# Patient Record
Sex: FEMALE | Race: WHITE | Employment: OTHER | URBAN - METROPOLITAN AREA
[De-identification: names, ages, dates, MRNs, and addresses within clinical notes are randomized per-mention and may not be internally consistent; named-entity substitution may affect disease eponyms.]

---

## 2017-03-03 ENCOUNTER — OFFICE VISIT (OUTPATIENT)
Dept: FAMILY MEDICINE CLINIC | Age: 77
End: 2017-03-03

## 2017-03-03 VITALS
TEMPERATURE: 97.5 F | WEIGHT: 180 LBS | HEIGHT: 61 IN | BODY MASS INDEX: 33.99 KG/M2 | DIASTOLIC BLOOD PRESSURE: 82 MMHG | RESPIRATION RATE: 20 BRPM | SYSTOLIC BLOOD PRESSURE: 122 MMHG | HEART RATE: 80 BPM

## 2017-03-03 DIAGNOSIS — R05.8 PRODUCTIVE COUGH: Primary | ICD-10-CM

## 2017-03-03 DIAGNOSIS — F43.21 GRIEF REACTION: ICD-10-CM

## 2017-03-03 PROCEDURE — 99213 OFFICE O/P EST LOW 20 MIN: CPT | Performed by: FAMILY MEDICINE

## 2017-03-03 RX ORDER — AZITHROMYCIN 250 MG/1
TABLET, FILM COATED ORAL
Qty: 1 PACKET | Refills: 0 | Status: SHIPPED | OUTPATIENT
Start: 2017-03-03 | End: 2017-03-13

## 2017-03-03 ASSESSMENT — ENCOUNTER SYMPTOMS
SINUS PRESSURE: 1
CONSTIPATION: 0
SORE THROAT: 1
COUGH: 1
DIARRHEA: 0
ABDOMINAL PAIN: 0

## 2017-05-22 RX ORDER — LEVOTHYROXINE SODIUM 0.03 MG/1
TABLET ORAL
Qty: 90 TABLET | Refills: 1 | Status: SHIPPED | OUTPATIENT
Start: 2017-05-22 | End: 2017-11-04 | Stop reason: SDUPTHER

## 2017-10-03 ENCOUNTER — OFFICE VISIT (OUTPATIENT)
Dept: FAMILY MEDICINE CLINIC | Age: 77
End: 2017-10-03

## 2017-10-03 VITALS
TEMPERATURE: 98.1 F | HEIGHT: 61 IN | WEIGHT: 174.2 LBS | HEART RATE: 78 BPM | BODY MASS INDEX: 32.89 KG/M2 | RESPIRATION RATE: 12 BRPM | SYSTOLIC BLOOD PRESSURE: 130 MMHG | DIASTOLIC BLOOD PRESSURE: 80 MMHG

## 2017-10-03 DIAGNOSIS — E78.5 HYPERLIPIDEMIA, UNSPECIFIED HYPERLIPIDEMIA TYPE: ICD-10-CM

## 2017-10-03 DIAGNOSIS — E03.9 ACQUIRED HYPOTHYROIDISM: ICD-10-CM

## 2017-10-03 DIAGNOSIS — M79.602 LEFT ARM PAIN: Primary | ICD-10-CM

## 2017-10-03 DIAGNOSIS — F32.5 MAJOR DEPRESSION, SINGLE EPISODE, IN COMPLETE REMISSION (HCC): ICD-10-CM

## 2017-10-03 DIAGNOSIS — R53.83 FATIGUE, UNSPECIFIED TYPE: ICD-10-CM

## 2017-10-03 LAB
ALBUMIN SERPL-MCNC: 4.1 G/DL (ref 3.9–4.9)
ALP BLD-CCNC: 78 U/L (ref 40–130)
ALT SERPL-CCNC: 13 U/L (ref 0–33)
ANION GAP SERPL CALCULATED.3IONS-SCNC: 14 MEQ/L (ref 7–13)
AST SERPL-CCNC: 15 U/L (ref 0–35)
BASOPHILS ABSOLUTE: 0 K/UL (ref 0–0.2)
BASOPHILS RELATIVE PERCENT: 0.6 %
BILIRUB SERPL-MCNC: 0.3 MG/DL (ref 0–1.2)
BUN BLDV-MCNC: 7 MG/DL (ref 8–23)
CALCIUM SERPL-MCNC: 9.4 MG/DL (ref 8.6–10.2)
CHLORIDE BLD-SCNC: 100 MEQ/L (ref 98–107)
CHOLESTEROL, TOTAL: 175 MG/DL (ref 0–199)
CO2: 26 MEQ/L (ref 22–29)
CREAT SERPL-MCNC: 0.59 MG/DL (ref 0.5–0.9)
EOSINOPHILS ABSOLUTE: 0.1 K/UL (ref 0–0.7)
EOSINOPHILS RELATIVE PERCENT: 1.8 %
GFR AFRICAN AMERICAN: >60
GFR NON-AFRICAN AMERICAN: >60
GLOBULIN: 2.5 G/DL (ref 2.3–3.5)
GLUCOSE BLD-MCNC: 112 MG/DL (ref 74–109)
HCT VFR BLD CALC: 41.9 % (ref 37–47)
HDLC SERPL-MCNC: 73 MG/DL (ref 40–59)
HEMOGLOBIN: 13.8 G/DL (ref 12–16)
LDL CHOLESTEROL CALCULATED: 82 MG/DL (ref 0–129)
LYMPHOCYTES ABSOLUTE: 1.3 K/UL (ref 1–4.8)
LYMPHOCYTES RELATIVE PERCENT: 23.8 %
MCH RBC QN AUTO: 28.1 PG (ref 27–31.3)
MCHC RBC AUTO-ENTMCNC: 32.8 % (ref 33–37)
MCV RBC AUTO: 85.5 FL (ref 82–100)
MONOCYTES ABSOLUTE: 0.4 K/UL (ref 0.2–0.8)
MONOCYTES RELATIVE PERCENT: 8 %
NEUTROPHILS ABSOLUTE: 3.6 K/UL (ref 1.4–6.5)
NEUTROPHILS RELATIVE PERCENT: 65.8 %
PDW BLD-RTO: 14.5 % (ref 11.5–14.5)
PLATELET # BLD: 281 K/UL (ref 130–400)
POTASSIUM SERPL-SCNC: 4.4 MEQ/L (ref 3.5–5.1)
RBC # BLD: 4.91 M/UL (ref 4.2–5.4)
SODIUM BLD-SCNC: 140 MEQ/L (ref 132–144)
T4 FREE: 1.41 NG/DL (ref 0.93–1.7)
TOTAL PROTEIN: 6.6 G/DL (ref 6.4–8.1)
TRIGL SERPL-MCNC: 99 MG/DL (ref 0–200)
TSH SERPL DL<=0.05 MIU/L-ACNC: 2.49 UIU/ML (ref 0.27–4.2)
WBC # BLD: 5.5 K/UL (ref 4.8–10.8)

## 2017-10-03 PROCEDURE — 99213 OFFICE O/P EST LOW 20 MIN: CPT | Performed by: FAMILY MEDICINE

## 2017-10-03 RX ORDER — RANITIDINE 150 MG/1
TABLET ORAL
Refills: 6 | COMMUNITY
Start: 2017-09-05 | End: 2018-11-01 | Stop reason: SDUPTHER

## 2017-10-03 RX ORDER — PREDNISONE 10 MG/1
TABLET ORAL
Qty: 51 TABLET | Refills: 0 | Status: SHIPPED | OUTPATIENT
Start: 2017-10-03 | End: 2017-10-13

## 2017-10-03 ASSESSMENT — PATIENT HEALTH QUESTIONNAIRE - PHQ9
SUM OF ALL RESPONSES TO PHQ9 QUESTIONS 1 & 2: 0
SUM OF ALL RESPONSES TO PHQ QUESTIONS 1-9: 0
2. FEELING DOWN, DEPRESSED OR HOPELESS: 0
1. LITTLE INTEREST OR PLEASURE IN DOING THINGS: 0

## 2017-10-03 ASSESSMENT — ENCOUNTER SYMPTOMS
NAUSEA: 0
ABDOMINAL PAIN: 0
SHORTNESS OF BREATH: 0
COUGH: 0
CONSTIPATION: 0
EYES NEGATIVE: 1
DIARRHEA: 0

## 2017-10-03 NOTE — PROGRESS NOTES
[address] [facility], 65 y.o. female presents today with:  Chief Complaint   Patient presents with    Arm Pain     would like to discuss right arm pain x 1 1/2 wks. states that this is unchanged. states that pain is moderate to severe when pain goes into the back. does wake her up out if her sleep at night, has tried OTC advil 600 mg no relief. denies injury, and swelling. states that when she tries to  something she gets a pain. HPI    Patient with complaints of left-sided shoulder and neck pain. Was helping her daughter [address] 2 weeks ago. No other questions and or concerns for today's visit      Review of Systems   Constitutional: Negative for activity change, appetite change, fatigue and fever. HENT: Negative for ear pain. Eyes: Negative. Respiratory: Negative for cough and shortness of breath. Cardiovascular: Negative for chest pain and palpitations. Gastrointestinal: Negative for abdominal pain, constipation, diarrhea and nausea. Genitourinary: Negative for dysuria and frequency. Neurological: Negative for dizziness and headaches. 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  repeat colonoscopy in one year    COLONOSCOPY  11/13/15    DR Aquilino Love    UPPER GASTROINTESTINAL ENDOSCOPY  8/17/09    DR Albertina Peoples   [facility] GASTROINTESTINAL ENDOSCOPY  10/31/14    DR Kayli Ford     Social History     Social History    Marital status:      Spouse name: Sierra Sloan    Number of children: 3    Years of education: N/A     Occupational History          retired     Social History Main Topics    Smoking status: Never Smoker    Smokeless tobacco: Never Used    Alcohol use No    Drug use: No    Sexual activity: Not on file     Other Topics Concern    Not on file     Social History Narrative     Family History   Problem Relation Age of Onset    Asthma Mother     Diabetes Mother     Heart Disease Mother     High Blood Pressure Mother     Emphysema Mother     High Blood Pressure Father      No Known Allergies  Current Outpatient Prescriptions   Medication Sig Dispense Refill    ranitidine (ZANTAC) 150 MG tablet TAKE ONE TABLET BY MOUTH DAILY EVERY 12 TO 24 HOURS  6    levothyroxine (SYNTHROID) 25 MCG tablet TAKE ONE TABLET BY MOUTH DAILY 90 tablet 1    omeprazole (PRILOSEC) 40 MG delayed release capsule Take 1 capsule by mouth daily 90 capsule 3    albuterol (PROVENTIL HFA;VENTOLIN HFA) 108 (90 BASE) MCG/ACT inhaler Inhale 2 puffs into the lungs 2 times daily 2 Inhaler 3    Multiple Vitamin (MULTIVITAMIN PO) Take  by mouth daily.  aspirin 81 MG EC tablet Take 81 mg by mouth daily.  calcium carbonate (OSCAL) 500 MG TABS tablet Take 500 mg by mouth daily. No current facility-administered medications for this visit. PMH, Surgical Hx, Family Hx, and Social Hx reviewed and updated. Health Maintenance reviewed. Objective    Vitals:    10/03/17 1157   BP: 130/80   Pulse: 78   Resp: 12   Temp: 98.1 °F (36.7 °C)   TempSrc: Temporal   Weight: 174 lb 3.2 oz (79 kg)   Height: 5' 1\" (1.549 m)       Physical Exam   Constitutional: She is oriented to person, place, and time. She appears well-developed and well-nourished. HENT:   Head: Normocephalic.    Mouth/Throat: Oropharynx is clear and moist. Eyes: Pupils are equal, round, and reactive to light. Neck: Normal range of motion. Neck supple. No thyromegaly present. Cardiovascular: Normal rate and intact distal pulses. Exam reveals no gallop and no friction rub. No murmur heard. Pulmonary/Chest: Effort normal and breath sounds normal.   Abdominal: Soft. Bowel sounds are normal.   Musculoskeletal: Normal range of motion. Neurological: She is alert and oriented to person, place, and time. Skin: Skin is warm and dry. Psychiatric: She has a normal mood and affect. Her behavior is normal.     She has some crepitus with rotational movements of the shoulder paracervical and trapezius muscles are spasming she has massage therapist coming to the house in 2 days. She also has some pain and tenderness of the biceps tendon and the lateral pack musculature on the left side. Assessment shoulder soft tissue inflammation and pain osteoarthritis is no evidence of cardiac dysfunction she was concerned about that she had an normal heart cath in October 2014. This point burst and taper steroids. The morning I's by massage and follow-up if not improved. Assessment & Plan   1. Left arm pain     2. Acquired hypothyroidism  TSH Without Reflex    T4, Free   3. Fatigue, unspecified type  CBC Auto Differential    Comprehensive Metabolic Panel   4.  Hyperlipidemia, unspecified hyperlipidemia type  Lipid Panel     Orders Placed This Encounter   Procedures    TSH Without Reflex     Standing Status:   Future     Standing Expiration Date:   10/3/2018    T4, Free     Standing Status:   Future     Standing Expiration Date:   10/3/2018    CBC Auto Differential     Standing Status:   Future     Standing Expiration Date:   10/3/2018    Comprehensive Metabolic Panel     Standing Status:   Future     Standing Expiration Date:   10/3/2018    Lipid Panel     Standing Status:   Future     Standing Expiration Date:   10/3/2018     Order Specific Question:   Is Patient Fasting?/# of Hours     Answer:   yes     Orders Placed This Encounter   Medications    predniSONE (DELTASONE) 10 MG tablet     Sig: Take 6 tabs x 6 days, then 5 x 1, 4 x 1, 3 x 1, 2 x 1, 1 x 1     Dispense:  51 tablet     Refill:  0     There are no discontinued medications. No Follow-up on file.         Controlled Substances Monitoring:          Cecilia Liz MD

## 2017-11-06 RX ORDER — LEVOTHYROXINE SODIUM 0.03 MG/1
TABLET ORAL
Qty: 90 TABLET | Refills: 0 | Status: SHIPPED | OUTPATIENT
Start: 2017-11-06 | End: 2017-11-07 | Stop reason: SDUPTHER

## 2017-11-07 ENCOUNTER — OFFICE VISIT (OUTPATIENT)
Dept: FAMILY MEDICINE CLINIC | Age: 77
End: 2017-11-07

## 2017-11-07 VITALS
SYSTOLIC BLOOD PRESSURE: 130 MMHG | HEART RATE: 61 BPM | TEMPERATURE: 97 F | HEIGHT: 61 IN | OXYGEN SATURATION: 96 % | WEIGHT: 177 LBS | BODY MASS INDEX: 33.42 KG/M2 | DIASTOLIC BLOOD PRESSURE: 76 MMHG | RESPIRATION RATE: 14 BRPM

## 2017-11-07 DIAGNOSIS — Z23 NEED FOR INFLUENZA VACCINATION: ICD-10-CM

## 2017-11-07 DIAGNOSIS — M25.512 LEFT SHOULDER PAIN, UNSPECIFIED CHRONICITY: Primary | ICD-10-CM

## 2017-11-07 DIAGNOSIS — Z12.31 VISIT FOR SCREENING MAMMOGRAM: ICD-10-CM

## 2017-11-07 PROCEDURE — 99213 OFFICE O/P EST LOW 20 MIN: CPT | Performed by: FAMILY MEDICINE

## 2017-11-07 PROCEDURE — G0008 ADMIN INFLUENZA VIRUS VAC: HCPCS | Performed by: FAMILY MEDICINE

## 2017-11-07 PROCEDURE — 90662 IIV NO PRSV INCREASED AG IM: CPT | Performed by: FAMILY MEDICINE

## 2017-11-07 RX ORDER — LEVOTHYROXINE SODIUM 0.03 MG/1
TABLET ORAL
Qty: 90 TABLET | Refills: 3 | Status: SHIPPED | OUTPATIENT
Start: 2017-11-07 | End: 2018-12-04 | Stop reason: SDUPTHER

## 2017-11-07 RX ORDER — LEVOTHYROXINE SODIUM 0.03 MG/1
TABLET ORAL
Qty: 90 TABLET | Refills: 1 | Status: CANCELLED | OUTPATIENT
Start: 2017-11-07

## 2017-11-07 ASSESSMENT — ENCOUNTER SYMPTOMS
EYES NEGATIVE: 1
SHORTNESS OF BREATH: 0
CONSTIPATION: 0
COUGH: 0
DIARRHEA: 0
NAUSEA: 0
ABDOMINAL PAIN: 0

## 2017-11-07 NOTE — PROGRESS NOTES
[address] [facility], 65 y.o. female presents today with:  Chief Complaint   Patient presents with    Shoulder Pain     follow up on left shoulder pain. was seen on 10/03 was given prednisone 10 mg with mild relief. states that pain is worst at night, does have trouble sleeping, will use heating pad with mild relief     Mole     mole on left shoulder is dark in color does have itchng. HPI    Patient for follow-up left shoulder pain also needs flu shot mammogram    No other questions and or concerns for today's visit      Review of Systems   Constitutional: Negative for activity change, appetite change, fatigue and fever. HENT: Negative for ear pain. Eyes: Negative. Respiratory: Negative for cough and shortness of breath. Cardiovascular: Negative for chest pain and palpitations. Gastrointestinal: Negative for abdominal pain, constipation, diarrhea and nausea. Genitourinary: Negative for dysuria and frequency. Neurological: Negative for dizziness and headaches. 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  repeat colonoscopy in one year    COLONOSCOPY  11/13/15    DR Miah Roman    UPPER GASTROINTESTINAL ENDOSCOPY  8/17/09    DR Chandana Mcadams    UPPER GASTROINTESTINAL ENDOSCOPY  10/31/14    DR Duffy Overall     Social History     Social History    Marital status:      Spouse name: Maria Ines Hassan    Number of children: 3    Years of education: N/A     Occupational History          retired     Social History Main Topics    Smoking status: Never Smoker    Smokeless tobacco: Never Used    Alcohol use No    Drug use: No    Sexual activity: Not on file     Other Topics Concern    Not on file     Social History Narrative    No narrative on file     Family History   Problem Relation Age of Onset    Asthma Mother     Diabetes Mother     Heart Disease Mother     High Blood Pressure Mother     Emphysema Mother     High Blood Pressure Father      No Known Allergies  Current Outpatient Prescriptions   Medication Sig Dispense Refill    levothyroxine (SYNTHROID) 25 MCG tablet TAKE ONE TABLET BY MOUTH EVERY DAY 90 tablet 0    ranitidine (ZANTAC) 150 MG tablet TAKE ONE TABLET BY MOUTH DAILY EVERY 12 TO 24 HOURS  6    omeprazole (PRILOSEC) 40 MG delayed release capsule Take 1 capsule by mouth daily 90 capsule 3    albuterol (PROVENTIL HFA;VENTOLIN HFA) 108 (90 BASE) MCG/ACT inhaler Inhale 2 puffs into the lungs 2 times daily 2 Inhaler 3    Multiple Vitamin (MULTIVITAMIN PO) Take  by mouth daily.  aspirin 81 MG EC tablet Take 81 mg by mouth daily.  calcium carbonate (OSCAL) 500 MG TABS tablet Take 500 mg by mouth daily. No current facility-administered medications for this visit. PMH, Surgical Hx, Family Hx, and Social Hx reviewed and updated. Health Maintenance reviewed. Objective    Vitals:    11/07/17 1439   BP: 130/76   Position: Sitting   Pulse: 61   Resp: 14   Temp: 97 °F (36.1 °C)   TempSrc: Temporal   SpO2: 96%   Weight: 177 lb (80.3 kg)   Height: 5' 1\" (1.549 m)       Physical Exam   Constitutional: She is oriented to person, place, and time. She appears well-developed and well-nourished. HENT:   Head: Normocephalic.    Mouth/Throat: Oropharynx is clear and moist.   Eyes: Pupils are equal, round, and reactive to light.   Neck: Normal range of motion. Neck supple. No thyromegaly present. Cardiovascular: Normal rate and intact distal pulses. Exam reveals no gallop and no friction rub. No murmur heard. Pulmonary/Chest: Effort normal and breath sounds normal.   Abdominal: Soft. Bowel sounds are normal.   Musculoskeletal: Normal range of motion. Neurological: She is alert and oriented to person, place, and time. Skin: Skin is warm and dry. Psychiatric: She has a normal mood and affect. Her behavior is normal.     The pain in the left arm in the biceps tendon is resolved she still has a little pain into the left upper chest since left-sided neck and left trapezius she's been getting therapeutic massages his been helping she'll continue to do that this does not resolve completely may need physical therapy referral.  Physical exam is normal as stated in the chart we'll get her her flu shot today and schedule her for mammography she leaves for [place] in January. Will return in April. Assessment & Plan   1. Left shoulder pain, unspecified chronicity     2. Need for influenza vaccination  INFLUENZA, HIGH DOSE, 65 YRS +, IM, PF, PREFILL SYR, 0.5ML (FLUZONE HD)   3. Visit for screening mammogram  VERONA DIGITAL SCREEN W OR WO CAD BILATERAL     Orders Placed This Encounter   Procedures    VERONA DIGITAL SCREEN W OR WO CAD BILATERAL     Standing Status:   Future     Standing Expiration Date:   1/7/2019     Order Specific Question:   Reason for exam:     Answer:   routine    INFLUENZA, HIGH DOSE, 65 YRS +, IM, PF, PREFILL SYR, 0.5ML (FLUZONE HD)     No orders of the defined types were placed in this encounter. There are no discontinued medications. No Follow-up on file.         Controlled Substances Monitoring:          Jayde Valdez MD

## 2017-11-13 ENCOUNTER — HOSPITAL ENCOUNTER (OUTPATIENT)
Dept: WOMENS IMAGING | Age: 77
Discharge: HOME | End: 2017-11-13
Payer: MEDICARE

## 2017-11-13 DIAGNOSIS — Z12.31 VISIT FOR SCREENING MAMMOGRAM: ICD-10-CM

## 2017-11-13 PROCEDURE — G0202 SCR MAMMO BI INCL CAD: HCPCS

## 2018-06-11 ENCOUNTER — OFFICE VISIT (OUTPATIENT)
Dept: OBGYN CLINIC | Age: 78
End: 2018-06-11
Payer: MEDICARE

## 2018-06-11 VITALS
SYSTOLIC BLOOD PRESSURE: 130 MMHG | BODY MASS INDEX: 32.85 KG/M2 | WEIGHT: 174 LBS | DIASTOLIC BLOOD PRESSURE: 74 MMHG | HEIGHT: 61 IN

## 2018-06-11 DIAGNOSIS — R39.89 BLADDER PAIN: ICD-10-CM

## 2018-06-11 DIAGNOSIS — N63.13 MASS OF LOWER OUTER QUADRANT OF RIGHT BREAST: ICD-10-CM

## 2018-06-11 DIAGNOSIS — Z12.31 SCREENING MAMMOGRAM, ENCOUNTER FOR: ICD-10-CM

## 2018-06-11 DIAGNOSIS — Z11.51 SCREENING FOR HPV (HUMAN PAPILLOMAVIRUS): ICD-10-CM

## 2018-06-11 DIAGNOSIS — N63.0 BREAST MASS: ICD-10-CM

## 2018-06-11 DIAGNOSIS — Z01.419 WELL WOMAN EXAM WITH ROUTINE GYNECOLOGICAL EXAM: Primary | ICD-10-CM

## 2018-06-11 DIAGNOSIS — Z78.0 POSTMENOPAUSAL: ICD-10-CM

## 2018-06-11 PROCEDURE — 99202 OFFICE O/P NEW SF 15 MIN: CPT | Performed by: OBSTETRICS & GYNECOLOGY

## 2018-06-11 PROCEDURE — 99387 INIT PM E/M NEW PAT 65+ YRS: CPT | Performed by: OBSTETRICS & GYNECOLOGY

## 2018-06-11 ASSESSMENT — ENCOUNTER SYMPTOMS
BLOOD IN STOOL: 0
COUGH: 0
CONSTIPATION: 0
SINUS PRESSURE: 0
COLOR CHANGE: 0
ABDOMINAL PAIN: 0
VOMITING: 0
NAUSEA: 0
WHEEZING: 0
SHORTNESS OF BREATH: 0

## 2018-06-11 ASSESSMENT — PATIENT HEALTH QUESTIONNAIRE - PHQ9
SUM OF ALL RESPONSES TO PHQ QUESTIONS 1-9: 2
2. FEELING DOWN, DEPRESSED OR HOPELESS: 1
1. LITTLE INTEREST OR PLEASURE IN DOING THINGS: 1
SUM OF ALL RESPONSES TO PHQ9 QUESTIONS 1 & 2: 2

## 2018-06-18 ENCOUNTER — HOSPITAL ENCOUNTER (OUTPATIENT)
Dept: WOMENS IMAGING | Age: 78
Discharge: HOME | End: 2018-06-20
Payer: MEDICARE

## 2018-06-18 ENCOUNTER — HOSPITAL ENCOUNTER (OUTPATIENT)
Dept: ULTRASOUND IMAGING | Age: 78
Discharge: HOME | End: 2018-06-20
Payer: MEDICARE

## 2018-06-18 DIAGNOSIS — N63.13 MASS OF LOWER OUTER QUADRANT OF RIGHT BREAST: ICD-10-CM

## 2018-06-18 DIAGNOSIS — N63.0 BREAST MASS: ICD-10-CM

## 2018-06-18 PROCEDURE — 76642 ULTRASOUND BREAST LIMITED: CPT

## 2018-06-18 PROCEDURE — 77065 DX MAMMO INCL CAD UNI: CPT

## 2018-06-25 ENCOUNTER — HOSPITAL ENCOUNTER (OUTPATIENT)
Dept: ULTRASOUND IMAGING | Age: 78
Discharge: HOME | End: 2018-06-27
Payer: MEDICARE

## 2018-06-25 ENCOUNTER — TELEPHONE (OUTPATIENT)
Dept: OBGYN CLINIC | Age: 78
End: 2018-06-25

## 2018-06-25 ENCOUNTER — HOSPITAL ENCOUNTER (OUTPATIENT)
Dept: GENERAL RADIOLOGY | Age: 78
Discharge: HOME | End: 2018-06-27
Payer: MEDICARE

## 2018-06-25 DIAGNOSIS — R39.89 BLADDER PAIN: ICD-10-CM

## 2018-06-25 DIAGNOSIS — Z78.0 POSTMENOPAUSAL: ICD-10-CM

## 2018-06-25 DIAGNOSIS — R39.89 BLADDER PAIN: Primary | ICD-10-CM

## 2018-06-25 PROCEDURE — 76856 US EXAM PELVIC COMPLETE: CPT

## 2018-06-25 PROCEDURE — 51798 US URINE CAPACITY MEASURE: CPT

## 2018-06-25 PROCEDURE — 76770 US EXAM ABDO BACK WALL COMP: CPT

## 2018-06-25 PROCEDURE — 76830 TRANSVAGINAL US NON-OB: CPT

## 2018-06-25 PROCEDURE — 77080 DXA BONE DENSITY AXIAL: CPT

## 2018-07-10 ENCOUNTER — OFFICE VISIT (OUTPATIENT)
Dept: OBGYN CLINIC | Age: 78
End: 2018-07-10
Payer: MEDICARE

## 2018-07-10 VITALS
WEIGHT: 174.6 LBS | DIASTOLIC BLOOD PRESSURE: 76 MMHG | SYSTOLIC BLOOD PRESSURE: 134 MMHG | BODY MASS INDEX: 32.97 KG/M2 | HEIGHT: 61 IN

## 2018-07-10 DIAGNOSIS — R39.89 BLADDER PAIN: Primary | ICD-10-CM

## 2018-07-10 PROCEDURE — 99213 OFFICE O/P EST LOW 20 MIN: CPT | Performed by: OBSTETRICS & GYNECOLOGY

## 2018-07-10 ASSESSMENT — ENCOUNTER SYMPTOMS
ABDOMINAL PAIN: 0
SHORTNESS OF BREATH: 0
WHEEZING: 0
BLOOD IN STOOL: 0
COUGH: 0
DIARRHEA: 0
CONSTIPATION: 0

## 2018-07-10 NOTE — PROGRESS NOTES
History and Physical  [facility] [address]  P: [phone] / F: 0 [address]  7/10/2018              77 y.o. Chief Complaint   Patient presents with    Results     pt here for DEXA and SONO results. /76   Ht 5' 1\" (1.549 m)   Wt 174 lb 9.6 oz (79.2 kg)   LMP 1990   BMI 32.99 kg/m²   Alllergies:  Patient has no known allergies. Primary Care Physician: Fadumo Craft MD    HPI : Thomas Carrillo 77 y.o.  female  Pt here to discuss Dexa scan. Lowest T score = -2.3 -1.4 on average. Discussed 9947-6156 mg Calcium and 9100-5138 iu Vitamin D supplementation in recommended doses. Also discussed weight bearing exercise. Risks, benefits and alternative therapies for treatment discussed. Pt elects Vit D and calcium for therapy. Pt would also like to review pelvic sono, pt with unremarkable pelvic, renal, bladder sono. Risks, benefits and alternative therapies for treatment discussed.  Pt elects myrbetriq for therapy.       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  Electronically signed by: Gt Sosa MD

## 2018-11-01 ENCOUNTER — OFFICE VISIT (OUTPATIENT)
Dept: FAMILY MEDICINE CLINIC | Age: 78
End: 2018-11-01
Payer: MEDICARE

## 2018-11-01 VITALS
HEIGHT: 61 IN | HEART RATE: 69 BPM | BODY MASS INDEX: 32.66 KG/M2 | RESPIRATION RATE: 20 BRPM | DIASTOLIC BLOOD PRESSURE: 64 MMHG | SYSTOLIC BLOOD PRESSURE: 118 MMHG | WEIGHT: 173 LBS | TEMPERATURE: 96.6 F

## 2018-11-01 DIAGNOSIS — R06.09 DOE (DYSPNEA ON EXERTION): ICD-10-CM

## 2018-11-01 DIAGNOSIS — Z23 NEED FOR INFLUENZA VACCINATION: ICD-10-CM

## 2018-11-01 DIAGNOSIS — E78.5 HYPERLIPIDEMIA, UNSPECIFIED HYPERLIPIDEMIA TYPE: Primary | ICD-10-CM

## 2018-11-01 DIAGNOSIS — E03.9 ACQUIRED HYPOTHYROIDISM: ICD-10-CM

## 2018-11-01 DIAGNOSIS — L60.2 NAIL THICKENING: ICD-10-CM

## 2018-11-01 DIAGNOSIS — D49.2 SKIN NEOPLASM: ICD-10-CM

## 2018-11-01 DIAGNOSIS — E78.5 HYPERLIPIDEMIA, UNSPECIFIED HYPERLIPIDEMIA TYPE: ICD-10-CM

## 2018-11-01 LAB
CHOLESTEROL, TOTAL: 196 MG/DL (ref 0–199)
HDLC SERPL-MCNC: 70 MG/DL (ref 40–59)
LDL CHOLESTEROL CALCULATED: 101 MG/DL (ref 0–129)
T4 FREE: 1.56 NG/DL (ref 0.93–1.7)
TRIGL SERPL-MCNC: 126 MG/DL (ref 0–200)
TSH SERPL DL<=0.05 MIU/L-ACNC: 2.82 UIU/ML (ref 0.27–4.2)

## 2018-11-01 PROCEDURE — 90662 IIV NO PRSV INCREASED AG IM: CPT | Performed by: FAMILY MEDICINE

## 2018-11-01 PROCEDURE — G8510 SCR DEP NEG, NO PLAN REQD: HCPCS | Performed by: FAMILY MEDICINE

## 2018-11-01 PROCEDURE — 99214 OFFICE O/P EST MOD 30 MIN: CPT | Performed by: FAMILY MEDICINE

## 2018-11-01 PROCEDURE — 3288F FALL RISK ASSESSMENT DOCD: CPT | Performed by: FAMILY MEDICINE

## 2018-11-01 PROCEDURE — G0008 ADMIN INFLUENZA VIRUS VAC: HCPCS | Performed by: FAMILY MEDICINE

## 2018-11-01 RX ORDER — RANITIDINE 150 MG/1
TABLET ORAL
Qty: 60 TABLET | Refills: 5 | Status: SHIPPED | OUTPATIENT
Start: 2018-11-01

## 2018-11-01 ASSESSMENT — ENCOUNTER SYMPTOMS
EYE ITCHING: 0
SORE THROAT: 0
DIARRHEA: 0
SHORTNESS OF BREATH: 0
EYE DISCHARGE: 0
ABDOMINAL PAIN: 0
CONSTIPATION: 0
COUGH: 0
SINUS PRESSURE: 0

## 2018-11-01 ASSESSMENT — PATIENT HEALTH QUESTIONNAIRE - PHQ9
1. LITTLE INTEREST OR PLEASURE IN DOING THINGS: 0
SUM OF ALL RESPONSES TO PHQ9 QUESTIONS 1 & 2: 0
SUM OF ALL RESPONSES TO PHQ QUESTIONS 1-9: 0
SUM OF ALL RESPONSES TO PHQ QUESTIONS 1-9: 0
2. FEELING DOWN, DEPRESSED OR HOPELESS: 0

## 2018-12-04 RX ORDER — LEVOTHYROXINE SODIUM 0.03 MG/1
TABLET ORAL
Qty: 90 TABLET | Refills: 3 | Status: SHIPPED | OUTPATIENT
Start: 2018-12-04

## 2019-01-28 ENCOUNTER — OFFICE VISIT (OUTPATIENT)
Dept: FAMILY MEDICINE CLINIC | Age: 79
End: 2019-01-28
Payer: MEDICARE

## 2019-01-28 VITALS
WEIGHT: 177 LBS | DIASTOLIC BLOOD PRESSURE: 78 MMHG | SYSTOLIC BLOOD PRESSURE: 130 MMHG | OXYGEN SATURATION: 95 % | TEMPERATURE: 97.5 F | BODY MASS INDEX: 33.42 KG/M2 | HEIGHT: 61 IN | RESPIRATION RATE: 16 BRPM | HEART RATE: 80 BPM

## 2019-01-28 DIAGNOSIS — R00.2 PALPITATIONS: ICD-10-CM

## 2019-01-28 DIAGNOSIS — R06.09 DOE (DYSPNEA ON EXERTION): ICD-10-CM

## 2019-01-28 DIAGNOSIS — R06.02 SOB (SHORTNESS OF BREATH): ICD-10-CM

## 2019-01-28 DIAGNOSIS — G89.29 CHRONIC LEFT SHOULDER PAIN: ICD-10-CM

## 2019-01-28 DIAGNOSIS — M25.512 CHRONIC LEFT SHOULDER PAIN: ICD-10-CM

## 2019-01-28 DIAGNOSIS — M54.2 NECK PAIN: Primary | ICD-10-CM

## 2019-01-28 PROCEDURE — 99214 OFFICE O/P EST MOD 30 MIN: CPT | Performed by: FAMILY MEDICINE

## 2019-01-28 PROCEDURE — 93000 ELECTROCARDIOGRAM COMPLETE: CPT | Performed by: FAMILY MEDICINE

## 2019-01-28 RX ORDER — TIZANIDINE 4 MG/1
4 TABLET ORAL 3 TIMES DAILY PRN
Qty: 60 TABLET | Refills: 1 | Status: SHIPPED | OUTPATIENT
Start: 2019-01-28

## 2019-01-28 RX ORDER — PREDNISONE 10 MG/1
TABLET ORAL
Qty: 51 TABLET | Refills: 0 | Status: SHIPPED | OUTPATIENT
Start: 2019-01-28 | End: 2019-02-07

## 2019-01-28 ASSESSMENT — ENCOUNTER SYMPTOMS: SHORTNESS OF BREATH: 1

## 2019-04-18 ENCOUNTER — TELEPHONE (OUTPATIENT)
Dept: OTHER | Facility: CLINIC | Age: 79
End: 2019-04-18

## 2019-12-27 RX ORDER — LEVOTHYROXINE SODIUM 0.03 MG/1
TABLET ORAL
Qty: 90 TABLET | Refills: 4 | OUTPATIENT
Start: 2019-12-27

## 2023-05-08 PROBLEM — I10 HYPERTENSION: Status: ACTIVE | Noted: 2023-05-08

## 2023-05-08 PROBLEM — E78.2 MIXED HYPERLIPIDEMIA: Status: ACTIVE | Noted: 2023-05-08

## 2023-05-08 PROBLEM — G89.29 NECK PAIN, CHRONIC: Status: ACTIVE | Noted: 2023-05-08

## 2023-05-08 PROBLEM — M54.2 NECK PAIN, CHRONIC: Status: ACTIVE | Noted: 2023-05-08

## 2023-05-08 PROBLEM — I35.1 MILD AORTIC REGURGITATION: Status: ACTIVE | Noted: 2023-05-08

## 2023-05-08 PROBLEM — E03.9 HYPOTHYROIDISM: Status: ACTIVE | Noted: 2023-05-08

## 2023-05-08 PROBLEM — I20.9 ANGINA, CLASS III (CMS-HCC): Status: ACTIVE | Noted: 2023-05-08

## 2023-05-08 PROBLEM — R51.9 HEADACHE: Status: ACTIVE | Noted: 2023-05-08

## 2023-05-08 PROBLEM — K21.9 CHRONIC GERD: Status: ACTIVE | Noted: 2023-05-08

## 2023-05-08 PROBLEM — E11.9 TYPE 2 DIABETES MELLITUS (MULTI): Status: ACTIVE | Noted: 2023-05-08

## 2023-05-09 ENCOUNTER — OFFICE VISIT (OUTPATIENT)
Dept: PRIMARY CARE | Facility: CLINIC | Age: 83
End: 2023-05-09
Payer: MEDICARE

## 2023-05-09 VITALS
BODY MASS INDEX: 35.17 KG/M2 | WEIGHT: 179.13 LBS | HEART RATE: 81 BPM | RESPIRATION RATE: 20 BRPM | DIASTOLIC BLOOD PRESSURE: 62 MMHG | HEIGHT: 60 IN | OXYGEN SATURATION: 95 % | TEMPERATURE: 98.1 F | SYSTOLIC BLOOD PRESSURE: 120 MMHG

## 2023-05-09 DIAGNOSIS — E11.9 TYPE 2 DIABETES MELLITUS WITHOUT COMPLICATION, UNSPECIFIED WHETHER LONG TERM INSULIN USE (MULTI): ICD-10-CM

## 2023-05-09 DIAGNOSIS — K59.04 CHRONIC IDIOPATHIC CONSTIPATION: Primary | ICD-10-CM

## 2023-05-09 DIAGNOSIS — I15.9 SECONDARY HYPERTENSION: ICD-10-CM

## 2023-05-09 DIAGNOSIS — K21.9 CHRONIC GERD: ICD-10-CM

## 2023-05-09 DIAGNOSIS — E78.2 MIXED HYPERLIPIDEMIA: ICD-10-CM

## 2023-05-09 DIAGNOSIS — E03.9 HYPOTHYROIDISM, UNSPECIFIED TYPE: ICD-10-CM

## 2023-05-09 PROCEDURE — 1159F MED LIST DOCD IN RCRD: CPT | Performed by: FAMILY MEDICINE

## 2023-05-09 PROCEDURE — 1170F FXNL STATUS ASSESSED: CPT | Performed by: FAMILY MEDICINE

## 2023-05-09 PROCEDURE — 3074F SYST BP LT 130 MM HG: CPT | Performed by: FAMILY MEDICINE

## 2023-05-09 PROCEDURE — 3078F DIAST BP <80 MM HG: CPT | Performed by: FAMILY MEDICINE

## 2023-05-09 PROCEDURE — 1036F TOBACCO NON-USER: CPT | Performed by: FAMILY MEDICINE

## 2023-05-09 PROCEDURE — 99213 OFFICE O/P EST LOW 20 MIN: CPT | Performed by: FAMILY MEDICINE

## 2023-05-09 RX ORDER — VALSARTAN AND HYDROCHLOROTHIAZIDE 80; 12.5 MG/1; MG/1
1 TABLET, FILM COATED ORAL DAILY
Qty: 90 TABLET | Refills: 1 | Status: SHIPPED | OUTPATIENT
Start: 2023-05-09 | End: 2023-09-27

## 2023-05-09 RX ORDER — MULTIVITAMIN
1 TABLET ORAL DAILY
COMMUNITY
Start: 2022-04-14

## 2023-05-09 RX ORDER — ACETAMINOPHEN 500 MG
1 TABLET ORAL 2 TIMES DAILY
COMMUNITY

## 2023-05-09 RX ORDER — MV-MN/C/THEANINE/HERB NO.310 1000-200MG
POWDER IN PACKET (EA) ORAL
COMMUNITY

## 2023-05-09 RX ORDER — ASPIRIN 81 MG/1
1 TABLET ORAL EVERY OTHER DAY
COMMUNITY
Start: 2016-11-30

## 2023-05-09 RX ORDER — VALSARTAN AND HYDROCHLOROTHIAZIDE 80; 12.5 MG/1; MG/1
1 TABLET, FILM COATED ORAL DAILY
COMMUNITY
Start: 2021-04-14 | End: 2023-05-09 | Stop reason: SDUPTHER

## 2023-05-09 RX ORDER — FAMOTIDINE 20 MG/1
20 TABLET, FILM COATED ORAL 2 TIMES DAILY
Qty: 90 TABLET | Refills: 1 | Status: SHIPPED | OUTPATIENT
Start: 2023-05-09 | End: 2023-07-05

## 2023-05-09 RX ORDER — FAMOTIDINE 20 MG/1
20 TABLET, FILM COATED ORAL 2 TIMES DAILY
COMMUNITY
Start: 2021-07-09 | End: 2023-05-09 | Stop reason: SDUPTHER

## 2023-05-09 ASSESSMENT — ACTIVITIES OF DAILY LIVING (ADL)
MANAGING_FINANCES: INDEPENDENT
BATHING: INDEPENDENT
TAKING_MEDICATION: INDEPENDENT
DRESSING: INDEPENDENT
GROCERY_SHOPPING: INDEPENDENT
DOING_HOUSEWORK: INDEPENDENT

## 2023-05-09 ASSESSMENT — PATIENT HEALTH QUESTIONNAIRE - PHQ9
1. LITTLE INTEREST OR PLEASURE IN DOING THINGS: NOT AT ALL
2. FEELING DOWN, DEPRESSED OR HOPELESS: NOT AT ALL
SUM OF ALL RESPONSES TO PHQ9 QUESTIONS 1 AND 2: 0

## 2023-05-09 NOTE — PROGRESS NOTES
"Subjective   Reason for Visit: Elisabeth Briseno is an 82 y.o. female here for a Medicare Wellness visit.     Past Medical, Surgical, and Family History reviewed and updated in chart.    Reviewed all medications by prescribing practitioner or clinical pharmacist (such as prescriptions, OTCs, herbal therapies and supplements) and documented in the medical record.    HPI  Presents today for above reason    Last eye exam: 1 year  Last dental: 1 year, denture check  Pt tries to follow low fat/sugar/salt diet  Exercises: daily  Denies SOB/chest pain/palpitations  Pt is not a smoker  No urinary or bowel issues per pt.  Sleeping well  Medications working well    Wwould like to discuss stomach discomfort  Ongoing x 1 year  Comes and goes  BM has changed per pt  States it is small and she has no \"pressure\"  Pt would like to discuss if she should have a colonoscopy.  Pt states  sent her a letter to discuss this with her PCP        Patient Care Team:  Parmjit Lenz MD as PCP - General     Review of Systems    Objective   Vitals:  /62   Pulse 81   Temp 36.7 °C (98.1 °F)   Resp 20   Ht 1.511 m (4' 11.5\")   Wt 81.3 kg (179 lb 2 oz)   SpO2 95%   BMI 35.57 kg/m²       Physical Exam  Patient in with complaints of chronic worsening constipation she takes Dulcolax and MiraLAX every day does not seem to be helping the gassy bloated abdominal discomfort and passing small balls of stool.  No other complaints.  Needs blood work and prescription refills.  Physical exam today's office visit constitutional alert and oriented x3.    Head is atraumatic HEENT is within normal limits.    Neck supple no masses full range of motion.    Thyroid is normal in size no thyromegaly there is no carotid bruits.    Pulmonary exam shows clear to auscultation no respiratory distress.    Cardiovascular shows no murmur rub or gallop.  Regular rate and rhythm.    Abdominal exam soft nontender no hepatosplenomegaly or masses normal bowel " sounds no rebound no guarding.    Musculoskeletal exam no joint pain no muscle pain full range of motion.    Psych exam normal mood and affect.    Dermatologic exam no skin lesions no rash no blemishes.    Neuro exam is no focal deficits.  Normal exam.    Extremities no edema normal pulses normal capillary refill.  She has some abdominal bloating and mild discomfort with palpation no rebound no guarding increased bowel sounds.  Assessment/Plan plan is to refer her to GI history of polyps and due for colonoscopy last one 3 years ago may not need one for polyp surveillance but may need a colonoscopy due to her chronic constipation issues.  We will refill her medication and get blood drawn follow-up when we have all the results and await GIs recommendations.  Problem List Items Addressed This Visit          Circulatory    Hypertension       Digestive    Chronic GERD       Endocrine/Metabolic    Hypothyroidism    Type 2 diabetes mellitus (CMS/HCC)       Other    Mixed hyperlipidemia

## 2023-07-04 DIAGNOSIS — K21.9 CHRONIC GERD: ICD-10-CM

## 2023-07-05 RX ORDER — FAMOTIDINE 20 MG/1
TABLET, FILM COATED ORAL
Qty: 180 TABLET | Refills: 0 | Status: SHIPPED | OUTPATIENT
Start: 2023-07-05 | End: 2023-09-27

## 2023-07-19 LAB
ALANINE AMINOTRANSFERASE (SGPT) (U/L) IN SER/PLAS: 14 U/L (ref 7–45)
ALBUMIN (G/DL) IN SER/PLAS: 4.1 G/DL (ref 3.4–5)
ALKALINE PHOSPHATASE (U/L) IN SER/PLAS: 59 U/L (ref 33–136)
ANION GAP IN SER/PLAS: 9 MMOL/L (ref 10–20)
ASPARTATE AMINOTRANSFERASE (SGOT) (U/L) IN SER/PLAS: 15 U/L (ref 9–39)
BILIRUBIN TOTAL (MG/DL) IN SER/PLAS: 0.5 MG/DL (ref 0–1.2)
CALCIUM (MG/DL) IN SER/PLAS: 9.4 MG/DL (ref 8.6–10.3)
CARBON DIOXIDE, TOTAL (MMOL/L) IN SER/PLAS: 32 MMOL/L (ref 21–32)
CHLORIDE (MMOL/L) IN SER/PLAS: 99 MMOL/L (ref 98–107)
CHOLESTEROL (MG/DL) IN SER/PLAS: 193 MG/DL (ref 0–199)
CHOLESTEROL IN HDL (MG/DL) IN SER/PLAS: 76.4 MG/DL
CHOLESTEROL/HDL RATIO: 2.5
CREATININE (MG/DL) IN SER/PLAS: 0.77 MG/DL (ref 0.5–1.05)
ERYTHROCYTE DISTRIBUTION WIDTH (RATIO) BY AUTOMATED COUNT: 13.3 % (ref 11.5–14.5)
ERYTHROCYTE MEAN CORPUSCULAR HEMOGLOBIN CONCENTRATION (G/DL) BY AUTOMATED: 32.9 G/DL (ref 32–36)
ERYTHROCYTE MEAN CORPUSCULAR VOLUME (FL) BY AUTOMATED COUNT: 85 FL (ref 80–100)
ERYTHROCYTES (10*6/UL) IN BLOOD BY AUTOMATED COUNT: 4.75 X10E12/L (ref 4–5.2)
GFR FEMALE: 76 ML/MIN/1.73M2
GLUCOSE (MG/DL) IN SER/PLAS: 103 MG/DL (ref 74–99)
HEMATOCRIT (%) IN BLOOD BY AUTOMATED COUNT: 40.4 % (ref 36–46)
HEMOGLOBIN (G/DL) IN BLOOD: 13.3 G/DL (ref 12–16)
LDL: 94 MG/DL (ref 0–99)
LEUKOCYTES (10*3/UL) IN BLOOD BY AUTOMATED COUNT: 5.7 X10E9/L (ref 4.4–11.3)
PLATELETS (10*3/UL) IN BLOOD AUTOMATED COUNT: 328 X10E9/L (ref 150–450)
POTASSIUM (MMOL/L) IN SER/PLAS: 3.7 MMOL/L (ref 3.5–5.3)
PROTEIN TOTAL: 6.8 G/DL (ref 6.4–8.2)
SODIUM (MMOL/L) IN SER/PLAS: 136 MMOL/L (ref 136–145)
THYROTROPIN (MIU/L) IN SER/PLAS BY DETECTION LIMIT <= 0.05 MIU/L: 3.05 MIU/L (ref 0.44–3.98)
TRIGLYCERIDE (MG/DL) IN SER/PLAS: 111 MG/DL (ref 0–149)
UREA NITROGEN (MG/DL) IN SER/PLAS: 10 MG/DL (ref 6–23)
VLDL: 22 MG/DL (ref 0–40)

## 2023-09-26 DIAGNOSIS — K21.9 CHRONIC GERD: ICD-10-CM

## 2023-09-26 DIAGNOSIS — I15.9 SECONDARY HYPERTENSION: ICD-10-CM

## 2023-09-27 RX ORDER — FAMOTIDINE 20 MG/1
TABLET, FILM COATED ORAL
Qty: 180 TABLET | Refills: 0 | Status: SHIPPED | OUTPATIENT
Start: 2023-09-27 | End: 2023-11-29

## 2023-09-27 RX ORDER — VALSARTAN AND HYDROCHLOROTHIAZIDE 80; 12.5 MG/1; MG/1
1 TABLET, FILM COATED ORAL DAILY
Qty: 90 TABLET | Refills: 3 | Status: SHIPPED | OUTPATIENT
Start: 2023-09-27

## 2023-09-30 ENCOUNTER — LAB (OUTPATIENT)
Dept: LAB | Facility: LAB | Age: 83
End: 2023-09-30
Payer: MEDICARE

## 2023-09-30 DIAGNOSIS — E78.2 MIXED HYPERLIPIDEMIA: Primary | ICD-10-CM

## 2023-09-30 LAB
CHOLEST SERPL-MCNC: 135 MG/DL (ref 0–199)
CHOLESTEROL/HDL RATIO: 1.9
HDLC SERPL-MCNC: 71.6 MG/DL
LDLC SERPL CALC-MCNC: 49 MG/DL (ref 140–190)
NON HDL CHOLESTEROL: 63 MG/DL (ref 0–149)
TRIGL SERPL-MCNC: 73 MG/DL (ref 0–149)
VLDL: 15 MG/DL (ref 0–40)

## 2023-09-30 PROCEDURE — 36415 COLL VENOUS BLD VENIPUNCTURE: CPT

## 2023-11-04 PROBLEM — M62.838 SPASM OF CERVICAL PARASPINOUS MUSCLE: Status: ACTIVE | Noted: 2023-11-04

## 2023-11-04 PROBLEM — R91.8 GROUND GLASS OPACITY PRESENT ON IMAGING OF LUNG: Status: ACTIVE | Noted: 2023-11-04

## 2023-11-04 PROBLEM — K44.9 HIATAL HERNIA: Status: ACTIVE | Noted: 2023-05-08

## 2023-11-04 PROBLEM — E87.1 LOW SODIUM LEVELS: Status: ACTIVE | Noted: 2023-11-04

## 2023-11-04 PROBLEM — J98.4 LUNG DENSITY ON X-RAY: Status: ACTIVE | Noted: 2023-11-04

## 2023-11-04 PROBLEM — M54.16 LUMBAR RADICULOPATHY: Status: ACTIVE | Noted: 2022-01-28

## 2023-11-04 PROBLEM — I34.0 MITRAL REGURGITATION: Status: ACTIVE | Noted: 2023-11-04

## 2023-11-04 PROBLEM — R79.1 ABNORMAL COAGULATION PROFILE: Status: ACTIVE | Noted: 2023-11-04

## 2023-11-04 PROBLEM — M54.32 SCIATICA OF LEFT SIDE: Status: ACTIVE | Noted: 2023-11-04

## 2023-11-04 PROBLEM — R94.30 ABNORMAL RESULTS OF CARDIOVASCULAR FUNCTION STUDIES: Status: ACTIVE | Noted: 2023-11-04

## 2023-11-04 PROBLEM — H35.363 DRUSEN OF MACULA OF BOTH EYES: Status: ACTIVE | Noted: 2022-06-30

## 2023-11-04 PROBLEM — R60.9 SWELLING: Status: ACTIVE | Noted: 2023-11-04

## 2023-11-04 PROBLEM — H11.30 SUBCONJUNCTIVAL HEMORRHAGE: Status: ACTIVE | Noted: 2023-11-04

## 2023-11-04 PROBLEM — R06.09 DYSPNEA ON EXERTION: Status: ACTIVE | Noted: 2023-11-04

## 2023-11-04 PROBLEM — R07.9 CHEST PAIN: Status: ACTIVE | Noted: 2023-11-04

## 2023-11-04 PROBLEM — M47.817 LUMBOSACRAL SPONDYLOSIS WITHOUT MYELOPATHY: Status: ACTIVE | Noted: 2022-01-10

## 2023-11-04 PROBLEM — F41.9 ANXIETY: Status: ACTIVE | Noted: 2023-11-04

## 2023-11-04 PROBLEM — H81.90 VESTIBULAR DIZZINESS: Status: ACTIVE | Noted: 2023-11-04

## 2023-11-04 PROBLEM — I25.10 CAD (CORONARY ARTERY DISEASE), NATIVE CORONARY ARTERY: Status: ACTIVE | Noted: 2023-11-04

## 2023-11-04 PROBLEM — K63.5 BENIGN COLON POLYP: Status: ACTIVE | Noted: 2023-11-04

## 2023-11-04 RX ORDER — PHENYLPROPANOLAMINE/CLEMASTINE 75-1.34MG
TABLET, EXTENDED RELEASE ORAL
COMMUNITY

## 2023-11-04 RX ORDER — FLUTICASONE PROPIONATE 50 MCG
2 SPRAY, SUSPENSION (ML) NASAL DAILY
COMMUNITY
Start: 2019-10-15 | End: 2023-11-06 | Stop reason: WASHOUT

## 2023-11-04 RX ORDER — GUAIFENESIN 600 MG/1
600 TABLET, EXTENDED RELEASE ORAL 2 TIMES DAILY
COMMUNITY
Start: 2019-10-15 | End: 2023-11-06 | Stop reason: WASHOUT

## 2023-11-04 RX ORDER — OMEPRAZOLE 40 MG/1
CAPSULE, DELAYED RELEASE ORAL
COMMUNITY
Start: 2020-04-06 | End: 2023-11-06 | Stop reason: WASHOUT

## 2023-11-04 RX ORDER — FAMOTIDINE 20 MG/1
20 TABLET, FILM COATED ORAL DAILY
COMMUNITY
Start: 2021-07-09 | End: 2023-11-06 | Stop reason: SDUPTHER

## 2023-11-04 RX ORDER — MULTIVITAMIN
TABLET ORAL
COMMUNITY
End: 2023-11-06 | Stop reason: WASHOUT

## 2023-11-04 RX ORDER — CALCIUM CARBONATE 600 MG
600 TABLET ORAL
COMMUNITY
Start: 2022-04-14

## 2023-11-04 RX ORDER — LEVOTHYROXINE SODIUM 50 UG/1
TABLET ORAL
COMMUNITY
Start: 2014-08-06 | End: 2023-11-06 | Stop reason: WASHOUT

## 2023-11-04 RX ORDER — ATORVASTATIN CALCIUM 40 MG/1
1 TABLET, FILM COATED ORAL NIGHTLY
COMMUNITY
Start: 2023-07-31 | End: 2023-11-06 | Stop reason: SDUPTHER

## 2023-11-06 ENCOUNTER — OFFICE VISIT (OUTPATIENT)
Dept: CARDIOLOGY | Facility: CLINIC | Age: 83
End: 2023-11-06
Payer: MEDICARE

## 2023-11-06 VITALS
WEIGHT: 177.7 LBS | DIASTOLIC BLOOD PRESSURE: 62 MMHG | HEART RATE: 74 BPM | BODY MASS INDEX: 35.89 KG/M2 | SYSTOLIC BLOOD PRESSURE: 128 MMHG

## 2023-11-06 DIAGNOSIS — R93.1 ELEVATED CORONARY ARTERY CALCIUM SCORE: ICD-10-CM

## 2023-11-06 DIAGNOSIS — I25.10 CORONARY ARTERY DISEASE INVOLVING NATIVE CORONARY ARTERY OF NATIVE HEART WITHOUT ANGINA PECTORIS: ICD-10-CM

## 2023-11-06 DIAGNOSIS — Z78.9 NEVER SMOKED ANY SUBSTANCE: ICD-10-CM

## 2023-11-06 DIAGNOSIS — I35.1 MILD AORTIC REGURGITATION: ICD-10-CM

## 2023-11-06 DIAGNOSIS — E11.9 TYPE 2 DIABETES MELLITUS WITHOUT COMPLICATION, WITHOUT LONG-TERM CURRENT USE OF INSULIN (MULTI): ICD-10-CM

## 2023-11-06 DIAGNOSIS — Z82.49 FAMILY HISTORY OF ISCHEMIC HEART DISEASE: ICD-10-CM

## 2023-11-06 DIAGNOSIS — E78.2 MIXED HYPERLIPIDEMIA: ICD-10-CM

## 2023-11-06 DIAGNOSIS — I34.0 MITRAL VALVE INSUFFICIENCY, UNSPECIFIED ETIOLOGY: ICD-10-CM

## 2023-11-06 PROCEDURE — 3074F SYST BP LT 130 MM HG: CPT | Performed by: INTERNAL MEDICINE

## 2023-11-06 PROCEDURE — 1036F TOBACCO NON-USER: CPT | Performed by: INTERNAL MEDICINE

## 2023-11-06 PROCEDURE — 3078F DIAST BP <80 MM HG: CPT | Performed by: INTERNAL MEDICINE

## 2023-11-06 PROCEDURE — 1159F MED LIST DOCD IN RCRD: CPT | Performed by: INTERNAL MEDICINE

## 2023-11-06 PROCEDURE — 99214 OFFICE O/P EST MOD 30 MIN: CPT | Performed by: INTERNAL MEDICINE

## 2023-11-06 RX ORDER — ATORVASTATIN CALCIUM 40 MG/1
40 TABLET, FILM COATED ORAL NIGHTLY
Qty: 90 TABLET | Refills: 3 | Status: SHIPPED | OUTPATIENT
Start: 2023-11-06 | End: 2024-11-05

## 2023-11-06 NOTE — PROGRESS NOTES
CARDIOLOGY OFFICE VISIT      CHIEF COMPLAINT      HISTORY OF PRESENT ILLNESS  The patient states that she has been doing well.  She has been having some sharp stabbing pains in her mid back area.  I told her this is not cardiac in nature.  She denies any chest discomfort or symptoms to suggest myocardial ischemia.  She denies any significant dyspnea.  She denies palpitations at syncope.  She is tolerating current medication without any problems.  I did go over her recent lipid profile with her.  Cholesterol 135, HDL 72, LDL 49, triglycerides 73.      IMPRESSIONS:   1. Coronary artery disease manifested by CT coronary calcium score of 27  2. Mitral regurgitation, mild   3. Aortic regurgitation, mild  4. Positive family history of coronary artery disease.  5. Obesity  6. Hyperlipidemia  7. Hypertension  8. Diabetes Mellitus, Type 2     Please excuse any errors in grammar or translation related to this dictation. Voice recognition software was utilized to prepare this document.   Past Medical History  Past Medical History:   Diagnosis Date    Elevated blood-pressure reading, without diagnosis of hypertension 10/22/2019    Elevated blood pressure reading    Encounter for immunization 04/28/2021    Need for shingles vaccine    Encounter for screening for cardiovascular disorders 09/16/2019    Screening for cardiovascular condition    Lower abdominal pain, unspecified 03/02/2021    Lower abdominal pain    Neoplasm of unspecified behavior of bone, soft tissue, and skin 09/23/2019    Skin neoplasm    Other abnormal glucose     Elevated glucose    Other malaise 04/10/2021    Malaise and fatigue    Other malaise 09/16/2019    Malaise and fatigue    Pain in unspecified finger(s) 09/16/2019    Finger pain    Personal history of other diseases of the musculoskeletal system and connective tissue 09/01/2021    History of low back pain    Personal history of other diseases of the musculoskeletal system and connective tissue  03/03/2021    History of muscle spasm    Personal history of other drug therapy 10/17/2019    History of influenza vaccination    Personal history of other specified conditions 04/10/2021    History of exertional chest pain    Personal history of other specified conditions 10/22/2019    History of headache    Personal history of other specified conditions 10/22/2019    History of dizziness    Personal history of other specified conditions 04/06/2020    History of epigastric pain    Personal history of other specified conditions 09/16/2019    History of urinary frequency    Shortness of breath 04/10/2021    SOB (shortness of breath) on exertion    Toxic effect of venom of bees, accidental (unintentional), initial encounter 08/06/2020    Bee sting, accidental or unintentional, initial encounter       Social History  Social History     Tobacco Use    Smoking status: Never    Smokeless tobacco: Never   Substance Use Topics    Alcohol use: Never    Drug use: Never       Family History     Family History   Problem Relation Name Age of Onset    Diabetes Mother      Heart attack Mother      Coronary artery disease Mother      Heart failure Mother      Coronary artery disease Father      Stroke Father      Breast cancer Sister      Diabetes Brother          Allergies:  No Known Allergies     Outpatient Medications:  Current Outpatient Medications   Medication Instructions    aspirin 81 mg EC tablet 1 tablet, oral, Every other day    atorvastatin (Lipitor) 40 mg tablet 1 tablet, oral, Nightly    calcium carbonate-vitamin D3 600 mg-20 mcg (800 unit) tablet 1 tablet, oral, 2 times daily    calcium carbonate 600 mg, oral, Daily RT    coffee xt-phosphatidyl serine (Neuriva Original) 100-100 mg capsule oral    famotidine (Pepcid) 20 mg tablet TAKE 1 TABLET BY MOUTH TWICE  DAILY    ibuprofen (Advil Migraine) capsule     multivitamin tablet 1 tablet, oral, Daily    valsartan-hydrochlorothiazide (Diovan-HCT) 80-12.5 mg tablet 1  tablet, oral, Daily          REVIEW OF SYSTEMS  Review of Systems   All other systems reviewed and are negative.      VITALS  Vitals:    11/06/23 1537   BP: 128/62   Pulse: 74       PHYSICAL EXAM  Vitals reviewed.   Constitutional:       Appearance: Normal and healthy appearance. Well-developed and not in distress.   Eyes:      Conjunctiva/sclera: Conjunctivae normal.      Pupils: Pupils are equal, round, and reactive to light.   Neck:      Vascular: No JVR. JVD normal.   Pulmonary:      Effort: Pulmonary effort is normal.      Breath sounds: Normal breath sounds. No wheezing. No rhonchi. No rales.   Chest:      Chest wall: Not tender to palpatation.   Cardiovascular:      PMI at left midclavicular line. Normal rate. Regular rhythm. Normal S1. Normal S2.       Murmurs: There is no murmur.      No gallop.  No click. No rub.   Pulses:     Intact distal pulses.   Edema:     Peripheral edema absent.   Abdominal:      Tenderness: There is no abdominal tenderness.   Musculoskeletal: Normal range of motion.         General: No tenderness.      Cervical back: Normal range of motion. Skin:     General: Skin is warm and dry.   Neurological:      General: No focal deficit present.      Mental Status: Alert and oriented to person, place and time.   Psychiatric:         Behavior: Behavior is cooperative.           ASSESSMENT AND PLAN  Diagnoses and all orders for this visit:  Coronary artery disease involving native coronary artery of native heart without angina pectoris  Elevated coronary artery calcium score  Mild aortic regurgitation  Family history of ischemic heart disease  Mixed hyperlipidemia  Mitral valve insufficiency, unspecified etiology  Type 2 diabetes mellitus without complication, without long-term current use of insulin (CMS/[facility])  BMI 35.0-35.9,adult  Never smoked any substance      [unfilled]

## 2023-11-06 NOTE — PATIENT INSTRUCTIONS
Patient to follow up in 1 year with Dr. Vipul Walter MD      No changes today.   Continue same medications and treatments.   Patient educated on proper medication use.   Patient educated on risk factor modification.   Please bring any lab results from other providers / physicians to your next appointment.     Please bring all medicines, vitamins, and herbal supplements with you when you come to the office.     Prescriptions will not be filled unless you are compliant with your follow up appointments or have a follow up appointment scheduled as per instruction of your physician. Refills should be requested at the time of your visit.    I, Inder Ji RN am scribing for and in the presence of Dr. Vipul Hamm MD

## 2023-11-28 DIAGNOSIS — K21.9 CHRONIC GERD: ICD-10-CM

## 2023-11-29 RX ORDER — FAMOTIDINE 20 MG/1
TABLET, FILM COATED ORAL
Qty: 180 TABLET | Refills: 0 | Status: SHIPPED | OUTPATIENT
Start: 2023-11-29 | End: 2024-01-31

## 2024-01-03 NOTE — PROGRESS NOTES
Subjective   Patient ID: Elisabeth Briseno is a 83 y.o. female who presents for Diabetes, Hypertension, and Hypothyroidism.    HPI  Presents today for above reason    Pt tries to follow low fat/sugar/salt diet  Does not exercise  Denies SOB/chest pain/palpitations  Pt is not a smoker  No urinary or bowel issues per pt.  Sleeping well  Medications working well    No other concerns today  Flu vaccine given/declined    Advanced Care Planning  Elisabeth Briseno and I discussed their advance care plan preferences such as living will, and durable health care power of , which include: yes Attempt Resuscitation, no Intubate & Ventilate, no Comfort Care or Life- Sustaning Care. I reminded patient to talk with their health care agent, Dr aPrmjit Lenz  , about their health care goals. Note reflects patient's free will and care goals as expressed to me.     Review of Systems  Constitutional:  no chills, no fever and no night sweats.  Eyes: no blurred vision and no eyesight problems.  ENT: no hearing loss, no nasal congestion, no hoarseness and no sore throat.  Neck: no mass (es) and no swelling.  Cardiovascular: no chest pain, no intermittent leg claudication, no lower extremity edema, no palpitation and no syncope.  Respiratory: no cough, no shortness of breath during exertion, no shortness of breath at rest and no wheezing.  Gastrointestinal: no abdominal pain, no blood in stools, no constipation, no diarrhea, no melena, no nausea, no rectal pain and no vomiting.  Genitourinary: no dysuria, no change in urinary frequency, no urinary hesitancy and no feelings of urinary urgency.  Musculoskeletal: no arthralgias, no back pain and no myalgias.  Integumentary: no new skin lesions and no rashes.  Neurological: no difficulty walking, no headache, no limb weakness, no numbness and no tingling.  Psychiatric/Behavioral: no anxiety, no depression, no anhedonia and no substance use disorders.  Endocrine: no recent weight  gain and no recent weight loss.  Hematologic/Lymphatic: no tendency for easy bruising and no swollen glands    Objective   Physical Exam  84-year-old female with a history of osteoarthritis hyperlipidemia hypertension and and hypothyroidism.  Stable no new complaints overall doing well blood pressures under good control.  Alert and oriented x 3.  Physical exam today's office visit constitutional alert and oriented x3.    Head is atraumatic HEENT is within normal limits.    Neck supple no masses full range of motion.    Thyroid is normal in size no thyromegaly there is no carotid bruits.    Pulmonary exam shows clear to auscultation no respiratory distress.    Cardiovascular shows no murmur rub or gallop.  Regular rate and rhythm.    Abdominal exam soft nontender no hepatosplenomegaly or masses normal bowel sounds no rebound no guarding.    Musculoskeletal exam no joint pain no muscle pain full range of motion.    Psych exam normal mood and affect.    Dermatologic exam no skin lesions no rash no blemishes.    Neuro exam is no focal deficits.  Normal exam.    Extremities no edema normal pulses normal capillary refill.    /62 (BP Location: Right arm, Patient Position: Sitting, BP Cuff Size: Large adult)   Pulse 76   Temp 36.6 °C (97.8 °F) (Temporal)   Ht 1.524 m (5')   Wt 80.6 kg (177 lb 12.8 oz)   SpO2 97%   BMI 34.72 kg/m²     Lab Results   Component Value Date    WBC 5.7 07/19/2023    HGB 13.3 07/19/2023    HCT 40.4 07/19/2023    MCV 85 07/19/2023     07/19/2023       Assessment/Plan chronic problem stable due for blood work we will get that drawn follow-up when we have the results refill her chronic medication if doing well routine recheck in 6 months.  Problem List Items Addressed This Visit    None

## 2024-01-04 ENCOUNTER — OFFICE VISIT (OUTPATIENT)
Dept: PRIMARY CARE | Facility: CLINIC | Age: 84
End: 2024-01-04
Payer: MEDICARE

## 2024-01-04 VITALS
HEART RATE: 76 BPM | WEIGHT: 177.8 LBS | SYSTOLIC BLOOD PRESSURE: 128 MMHG | BODY MASS INDEX: 34.91 KG/M2 | HEIGHT: 60 IN | OXYGEN SATURATION: 97 % | TEMPERATURE: 97.8 F | DIASTOLIC BLOOD PRESSURE: 62 MMHG

## 2024-01-04 DIAGNOSIS — E78.2 MIXED HYPERLIPIDEMIA: ICD-10-CM

## 2024-01-04 DIAGNOSIS — E03.9 HYPOTHYROIDISM, UNSPECIFIED TYPE: ICD-10-CM

## 2024-01-04 DIAGNOSIS — I10 PRIMARY HYPERTENSION: Primary | ICD-10-CM

## 2024-01-04 PROCEDURE — 1159F MED LIST DOCD IN RCRD: CPT | Performed by: FAMILY MEDICINE

## 2024-01-04 PROCEDURE — 3078F DIAST BP <80 MM HG: CPT | Performed by: FAMILY MEDICINE

## 2024-01-04 PROCEDURE — 1036F TOBACCO NON-USER: CPT | Performed by: FAMILY MEDICINE

## 2024-01-04 PROCEDURE — 99213 OFFICE O/P EST LOW 20 MIN: CPT | Performed by: FAMILY MEDICINE

## 2024-01-04 PROCEDURE — 3074F SYST BP LT 130 MM HG: CPT | Performed by: FAMILY MEDICINE

## 2024-01-30 DIAGNOSIS — K21.9 CHRONIC GERD: ICD-10-CM

## 2024-01-31 RX ORDER — FAMOTIDINE 20 MG/1
TABLET, FILM COATED ORAL
Qty: 180 TABLET | Refills: 1 | Status: SHIPPED | OUTPATIENT
Start: 2024-01-31

## 2024-06-05 ENCOUNTER — TELEPHONE (OUTPATIENT)
Dept: CARDIOLOGY | Facility: CLINIC | Age: 84
End: 2024-06-05
Payer: MEDICARE

## 2024-06-05 NOTE — TELEPHONE ENCOUNTER
Patient called and LM stating she was having a reaction to the atorvastatin. Attempted to call patient back to obtain more information at both numbers listed. No answer, LM to call office back with symptoms. Advised if patient is having chest pain or SOB they need to go to the ER. Per patient's chart, it appears that the patient has been on the atorvastatin since 7/31/23 per office note from Dr. Vipul Hamm MD. Routed to Evie Fortune LPN for follow up.

## 2024-06-06 NOTE — TELEPHONE ENCOUNTER
Call returned to patient.  She states that since the beginning of the year she has been experiencing dizziness, itching and pain in her stomach.  She has fallen in her yard from the dizziness and broke her finger.  She states that she just never told anybody about this.  She thinks it maybe from the Atorvastatin.  Printed for Dr. Vipul Hamm to review.

## 2024-06-07 ENCOUNTER — OFFICE VISIT (OUTPATIENT)
Dept: PRIMARY CARE | Facility: CLINIC | Age: 84
End: 2024-06-07
Payer: MEDICARE

## 2024-06-07 VITALS
WEIGHT: 173.6 LBS | HEART RATE: 71 BPM | BODY MASS INDEX: 34.08 KG/M2 | TEMPERATURE: 96.6 F | HEIGHT: 60 IN | DIASTOLIC BLOOD PRESSURE: 64 MMHG | SYSTOLIC BLOOD PRESSURE: 130 MMHG | OXYGEN SATURATION: 96 %

## 2024-06-07 DIAGNOSIS — R10.9 STOMACH PAIN: ICD-10-CM

## 2024-06-07 DIAGNOSIS — R42 DIZZINESS: Primary | ICD-10-CM

## 2024-06-07 DIAGNOSIS — E66.09 CLASS 1 OBESITY DUE TO EXCESS CALORIES WITH SERIOUS COMORBIDITY AND BODY MASS INDEX (BMI) OF 33.0 TO 33.9 IN ADULT: ICD-10-CM

## 2024-06-07 PROCEDURE — 1036F TOBACCO NON-USER: CPT | Performed by: FAMILY MEDICINE

## 2024-06-07 PROCEDURE — 99213 OFFICE O/P EST LOW 20 MIN: CPT | Performed by: FAMILY MEDICINE

## 2024-06-07 PROCEDURE — 3075F SYST BP GE 130 - 139MM HG: CPT | Performed by: FAMILY MEDICINE

## 2024-06-07 PROCEDURE — 1159F MED LIST DOCD IN RCRD: CPT | Performed by: FAMILY MEDICINE

## 2024-06-07 PROCEDURE — 3078F DIAST BP <80 MM HG: CPT | Performed by: FAMILY MEDICINE

## 2024-06-07 RX ORDER — MECLIZINE HYDROCHLORIDE 25 MG/1
25 TABLET ORAL 3 TIMES DAILY PRN
Qty: 30 TABLET | Refills: 1 | Status: SHIPPED | OUTPATIENT
Start: 2024-06-07 | End: 2025-06-07

## 2024-06-07 ASSESSMENT — PATIENT HEALTH QUESTIONNAIRE - PHQ9
SUM OF ALL RESPONSES TO PHQ9 QUESTIONS 1 AND 2: 0
2. FEELING DOWN, DEPRESSED OR HOPELESS: NOT AT ALL
1. LITTLE INTEREST OR PLEASURE IN DOING THINGS: NOT AT ALL

## 2024-06-07 NOTE — TELEPHONE ENCOUNTER
Per Dr. Vipul Hamm , doubt the Atorvastatin is causing the issues.  However, patient to stop Atorvastatin for 1 month and then let us know how she is doing if symptoms resolved.  Call returned to patient and advised.  Patient verbalized understanding.  After the month, she does not want to go back on Atrovastatin and would like to try Crestor.

## 2024-06-07 NOTE — PROGRESS NOTES
Subjective   Patient ID: Elisabeth Briseno is a 83 y.o. female who presents for Dizziness and Abdominal Pain.  HPI    Patient presents in the office today with complaints of vertigo. Patient states this started when she was started on Atorvastatin that was prescribed by Dr. Hamm. Patient states that she contacted his office and he advised that she stop this for a month. Patient stopped this medication today. Ongoing X 4 months.    Patient states that she is having stomach pains. Patient describes that pain as piercing. Patient states that this has been ongoing X 4 months. Denies nausea, vomiting, and diarrhea. Has only tried acid reflux medication.     Patient fell in her garden last week and broke her pinkie finger on her left hand. Patient states that this was not caused by dizziness that she just tripped over something.  Patient states that she possibly might need surgery for this.     Review of Systems  Constitutional:  no chills, no fever and no night sweats.  Eyes: no blurred vision and no eyesight problems.  ENT: no hearing loss, no nasal congestion, no hoarseness and no sore throat.  Neck: no mass (es) and no swelling.  Cardiovascular: no chest pain, no intermittent leg claudication, no lower extremity edema, no palpitation and no syncope.  Respiratory: no cough, no shortness of breath during exertion, no shortness of breath at rest and no wheezing.  Gastrointestinal: no abdominal pain, no blood in stools, no constipation, no diarrhea, no melena, no nausea, no rectal pain and no vomiting.  Genitourinary: no dysuria, no change in urinary frequency, no urinary hesitancy and no feelings of urinary urgency.  Musculoskeletal: no arthralgias, no back pain and no myalgias.  Integumentary: no new skin lesions and no rashes.  Neurological: no difficulty walking, no headache, no limb weakness, no numbness and no tingling.  Psychiatric/Behavioral: no anxiety, no depression, no anhedonia and no substance use  disorders.  Endocrine: no recent weight gain and no recent weight loss.  Hematologic/Lymphatic: no tendency for easy bruising and no swollen glands    Objective   Physical Exam  Patient with intermittent vertigo believes it started up happening more often when he was started on atorvastatin although this was back in July it has been worse recently was advised by cardiology to stop the medication she just stopped it this morning.  Otherwise doing okay elderly female in no acute distress pupils equal reactive to light and accommodation extract muscle intact TMs clear no sinus tenderness neck is supple lungs are clear no wheeze rhonchi crackles or retractions.  Cardiac and abdominal exams are benign.  /64 (BP Location: Right arm, Patient Position: Sitting)   Pulse 71   Temp 35.9 °C (96.6 °F) (Temporal)   Ht 1.524 m (5')   Wt 78.7 kg (173 lb 9.6 oz)   SpO2 96%   BMI 33.90 kg/m²     Lab Results   Component Value Date    WBC 5.7 07/19/2023    HGB 13.3 07/19/2023    HCT 40.4 07/19/2023    MCV 85 07/19/2023     07/19/2023       Assessment/Plan assessments vertigo questionable etiology plan stop the atorvastatin follow-up if not improving or if it worsens or new symptoms develop she will let us know  Problem List Items Addressed This Visit       BMI 33.0-33.9,adult     Other Visit Diagnoses       Dizziness    -  Primary    Stomach pain        Class 1 obesity due to excess calories with serious comorbidity and body mass index (BMI) of 33.0 to 33.9 in adult

## 2024-06-27 DIAGNOSIS — K21.9 CHRONIC GERD: ICD-10-CM

## 2024-06-27 RX ORDER — FAMOTIDINE 20 MG/1
TABLET, FILM COATED ORAL
Qty: 180 TABLET | Refills: 1 | Status: SHIPPED | OUTPATIENT
Start: 2024-06-27

## 2024-06-27 NOTE — TELEPHONE ENCOUNTER
Chief complaint:   Chief Complaint   Patient presents with   • Follow-up     Lab results follow up       Vitals:  Visit Vitals  /72 (BP Location: RUE - Right upper extremity, Patient Position: Sitting, Cuff Size: Large Adult)   Pulse 62   Temp 98.5 °F (36.9 °C) (Oral)   Ht 5' 11\" (1.803 m)   Wt (!) 161.9 kg   SpO2 96%   BMI 49.79 kg/m²       HISTORY OF PRESENT ILLNESS     HPI       Patient presents for follow up visit.      HTN. BP went up after he stopped metoprolol (due to persistent fatigue) and switched to diltiazem  mg nelson. BP has been gradually going down with each day, NL today. He seems to tolerate medication well.     CAD, 50% LAD stenosis.     Paroxysmal A fib. Hx intermittent palpitations with rapid HR. This often triggers sob, anxiety.  EP consult Dr Ennis follows. He is on diltiazem and ASA.   Stress test negative, NL EF 10/13.     Hx intermittent tingling in B hands, also B feet, R foot the most. He reports that this happens often after prolong work day, prolong standing.     Hx chronic leg edema, venous insufficiency, lymphedema. edema improved significantly with compression stockings but still only partially, worse after work. He requests new rx.     ...    Morbid obesity. He reports prior wt of up to 529 lbs. He lost wt with Atkins diet. Wt 378->350.    Pre-DM.   HLP, diet advised. He was on lipitor 80 mg qhs and pravachol, stopped b/o muscle aches.    ...  MORENA, severe apnea. Pt was able to buy CPAP from friend and has been using it on his own. He had sleep study and now has Pulm follow up 1/22 with Dr Butler.     COPD/chronic bronchitis. Chronic cough, with clear phlegm. No fever. No chills. No exertional cp.     Chronic cough, sob, significant improvement with z-jose g, prednisone. NB helped a lot in ER. He is using albuterol mdi prn now. S/p PFTs 12/28/15. CT angio 12/14/15 in ER: no PE.     L urolithiasis. Urology follows.     Hx rectal bleeding, mild chronic, pressure ache in lower  Last seen 6/7/24  Medication pended     abdomen, urge to have BMs even when he does not have to go. GI follows, colonoscopy scheduled.     Colon  Polyp 11/19/13.   Mild colitis on colonoscopy/Bx. Hx LUQ pains. GI Dr Jimenez follows.     Hx recurrent boils. He has one that is healed on lower abdomen and multiple scattered small boils on B axillary areas, B buttocks and R lower back.     Anxiety, chronic. Panic attacks.  He uses lorazepam 0.5 mg 1/2 tab qd prn. Zoloft was not tolerated. In the past he had improved on effexor. No psychiatrist at this time.     Hx MJ use 3/2013 +ve screen. He denies any recent drug use.     Low wbc. Anemia panel requested.     He received SSD disability. He is unable to work due to morbid obesity, chronic L lower leg edema and constant pain. Doppler in 2014 showed severe venous insufficiency.         Other significant problems:  Patient Active Problem List    Diagnosis Date Noted   • Normal left ventricular function, EF  WNL per stress echo 12/24/2016 08/16/2018     Priority: Low   • Iron deficiency 09/13/2017     Priority: Low   • Vitamin D deficiency 10/13/2016     Priority: Low   • Kidney stone 08/30/2016     Priority: Low   • Pain of left lower leg 05/12/2016     Priority: Low   • Chronic bronchitis (CMS/HCC) 04/06/2016     Priority: Low   • Nicotine dependence, uncomplicated 08/05/2015     Priority: Low   • Coronary artery disease involving native coronary artery without angina pectoris 07/15/2015     Priority: Low   • Hyperlipidemia 05/15/2014     Priority: Low   • History of tobacco use 01/22/2014     Priority: Low   • Sleep apnea, obstructive 01/22/2014     Priority: Low   • Panic attacks 11/21/2013     Priority: Low   • Anxiety 11/21/2013     Priority: Low   • Colitis 11/21/2013     Priority: Low   • Tubulovillous adenoma polyp of colon 11/21/2013     Priority: Low   • Chronic venous insufficiency 11/21/2013     Priority: Low   • Leg edema 11/21/2013     Priority: Low   • Pre-diabetes 11/21/2013     Priority: Low   •  Blood in stool      Priority: Low   • Obstructive sleep apnea 12/01/2011     Priority: Low     .       • Paroxysmal atrial fibrillation, on Diltiazem, RUBY score <1 (no warfain) 11/29/2011     Priority: Low   • Morbid obesity 11/29/2011     Priority: Low   • Lymphatic obstruction 11/29/2011     Priority: Low       PAST MEDICAL, FAMILY AND SOCIAL HISTORY     Current Outpatient Medications   Medication Sig Dispense Refill   • Omega-3 Fatty Acids (FISH OIL PO)      • budesonide-formoterol (SYMBICORT) 160-4.5 MCG/ACT inhaler Rinse mouth with water after use to reduce aftertaste and incidence of candidiasis. Do not swallow.     • dilTIAZem (CARDIZEM CD) 120 MG 24 hr capsule TAKE 1 CAPSULE BY MOUTH DAILY. 90 capsule 3   • LORazepam (ATIVAN) 0.5 MG tablet Take 1 tablet by mouth every 8 hours. 30 tablet 1   • cholecalciferol (VITAMIN D3) 1000 UNITS tablet Take 4,000 Units by mouth daily.      • POTASSIUM CITRATE ER PO Take 99 mg by mouth daily.     • Magnesium Citrate 200 MG Tab Take by mouth daily.     • VITAMIN K PO Take 100 mcg by mouth daily.     • beta carotene (VITAMIN A) 48225 UNIT capsule Take 25,000 Units by mouth daily.     • ferrous sulfate 325 (65 FE) MG tablet Take 325 mg by mouth daily (with breakfast).     • ZINC SULFATE PO Take 50 mg by mouth.     • NIACINAMIDE PO Take 500 mg by mouth.     • Ascorbic Acid (VITAMIN C) 1000 MG tablet Take 1,000 mg by mouth daily.     • PROAIR  (90 Base) MCG/ACT inhaler TAKE 2 PUFFS BY MOUTH EVERY 4 HOURS AS NEEDED FOR COUGH AND WHEEZING 8.5 Inhaler 5   • Multiple Vitamins-Minerals (MULTIVITAMIN PO) Take 1 tablet by mouth 3 times daily.      • ondansetron (ZOFRAN ODT) 4 MG disintegrating tablet Place 2 tablets onto the tongue every 8 hours as needed for Nausea. 6 tablet 0   • famotidine (PEPCID) 20 MG tablet Take 1 tablet by mouth 2 times daily. 60 tablet 0     No current facility-administered medications for this visit.        Allergies:  ALLERGIES:   Allergen Reactions    • Metoprolol SHORTNESS OF BREATH     SOB, fatigue, felt like he was \"dying\" everyday    • Pantoprazole GI UPSET     Pt states \"made my esophagus go crazy\"   • Statins MYALGIA     Atorvastatin and Pravastatin       Past Medical  History/Surgeries:  Past Medical History:   Diagnosis Date   • Anemia     iron defiecincy   • Anxiety     panic attacks   • Atrial fibrillation (CMS/HCC) 11/29/2011    no warfarin needed/low RUBY score   • Bronchitis    • Chronic kidney disease     kidney stone   • Chronic pain     back/leg   • Colon polyps 11/19/2013    tubulovillous adenoma, hyperplastic   • COPD (chronic obstructive pulmonary disease) (CMS/HCC)    • Coronary artery disease     atrial fib   • Edema of both legs     chronic   • Fracture 1977    Rt. wrist   • Gastroesophageal reflux disease    • Inflammatory bowel disease     Hx colitis   • Obesity Morbid 11/29/2011   • MORENA (obstructive sleep apnea)     uses CPAP   • SOB (shortness of breath) on exertion    • Tingling in extremities        Past Surgical History:   Procedure Laterality Date   • Cardiac catherization  07/07/2015    50% prox LAD stenosis tx medically   • Colonoscopy diagnostic  11/19/2013    Polyps (tubulovillous adenoma & hyperplastic), limited segment sigmoid inflammatory change   • Colonoscopy diagnostic  06/01/2017    hyperplastic/tubular polyp 3yr recall   • Flexible sigmoidoscopy  12/26/13    Short segment colitis persists   • Fracture surgery  1977    rt wrist ORIF   • Hernia repair  1/2003   • Pilonidal cyst drainage     • Removal gallbladder     • Vascular surgery     • Vein ligation and stripping      left leg       Family History:  Family History   Problem Relation Age of Onset   • Cancer Father    • Cancer Sister        Social History:  Social History     Tobacco Use   • Smoking status: Current Every Day Smoker     Packs/day: 1.00     Years: 21.00     Pack years: 21.00     Types: Cigarettes, Cigars   • Smokeless tobacco: Never Used   Substance Use  Topics   • Alcohol use: Yes     Alcohol/week: 0.0 - 1.0 standard drinks     Frequency: Never     Drinks per session: 1 or 2     Binge frequency: Never     Comment: Rare       REVIEW OF SYSTEMS     Review of Systems   All other systems reviewed and are negative.      PHYSICAL EXAM     Physical Exam  Vitals signs reviewed.   Constitutional:       General: He is not in acute distress.     Appearance: He is well-developed. He is not diaphoretic.   HENT:      Head: Normocephalic and atraumatic.   Eyes:      General: No scleral icterus.     Conjunctiva/sclera: Conjunctivae normal.      Pupils: Pupils are equal, round, and reactive to light.   Neck:      Musculoskeletal: Neck supple.      Vascular: No JVD.   Cardiovascular:      Rate and Rhythm: Normal rate and regular rhythm.      Heart sounds: No murmur. No gallop.    Pulmonary:      Effort: Pulmonary effort is normal. No respiratory distress.      Breath sounds: Normal breath sounds. No wheezing or rales.   Abdominal:      General: There is no distension.      Palpations: Abdomen is soft. There is no mass.      Tenderness: There is no tenderness. There is no guarding.   Skin:     General: Skin is warm.      Coloration: Skin is not pale.      Findings: No rash.   Neurological:      Mental Status: He is alert and oriented to person, place, and time.   Psychiatric:         Behavior: Behavior normal.       Recentlab tests:   Sodium (mmol/L)   Date Value   10/09/2019 140      Potassium (mmol/L)   Date Value   10/09/2019 4.3      BUN (mg/dL)   Date Value   10/09/2019 21 (H)      Creatinine (mg/dL)   Date Value   10/09/2019 0.68     Hemoglobin A1C (%)   Date Value   01/08/2015 5.5      Glucose (mg/dL)   Date Value   10/09/2019 81      No results found for: MALBCR  CHOLESTEROL (mg/dL)   Date Value   10/09/2019 213 (H)      HDL (mg/dL)   Date Value   10/09/2019 64     TRIGLYCERIDE (mg/dL)   Date Value   10/09/2019 60      CALCULATED LDL (mg/dL)   Date Value   10/09/2019 137 (H)      ALT/SGPT (Units/L)   Date Value   10/09/2019 34      AST/SGOT (Units/L)   Date Value   10/09/2019 26     HGB (g/dL)   Date Value   10/09/2019 13.4      VITAMIND, 25 HYDROXY (ng/mL)   Date Value   10/09/2019 32.6     TSH (mcUnits/mL)   Date Value   10/09/2019 1.324             ASSESSMENT/PLAN           ...  HTN. Continue diltiazem. Monitor bp.   Obesity, he improved diet with good results.     Vit D deficiency. continue vit D 50,000 IU 2/weekly. Monitor level.       Bronchitis. chronic cough, sob. S/p PFT. S/p chest CT. Continue albuterol.     Episode of sob, anxiety, hot sensation in back and neck that started with exertion/walking, concrining for atypical angina. Cardiology follow up advised.     Smoking cessation.  wellbutrin.   Anxiety. He changed effexor to wellbutrin. Lorazepam prn, risks of sedation d/w pt, avoid driving after taking rx. MH consult placed.      ...  Intermittent tingling in B hands, also B feet, R foot the most. DDx d/w pt, labs requested. Neurology consult.     Chronic leg edema, venous insufficiency, lymphedema. edema improved significantly with compression stockings but still only partially. Lymphedema consult.     ...  CAD, 50% LAD stenosis. No angina.     Paroxysmal A fib. Hx intermittent palpitations with rapid HR. This often triggers sob, anxiety. No symptoms since DC. EP consult Dr Ennis follows. He is on diltiazem and ASA.   Stress test negative, NL EF 10/13.     Chronic leg edema. L>R. Severe chronic venous insufficiency on L. Varicose veins. S/p prior surgery. Vascular consult. Compression stockings.     GERD.  protonix.     Morbid obesity. He reports prior wt of up to 529 lbs. He lost wt with Atkins diet.     Pre-DM. Fasting labs, a1c.   HLP. Diet advised. Statins not tolerated.     MORENA. Pulmonary consult follows.    Colon  Polyp 11/19/13.   Mild colitis on colonoscopy/Bx. Hx LUQ pains. GI Dr Jimenez follows.     Iron deficiency, improved to low NL now. Advised fe sulfate 325  mg daily. FOBT. Monitor.    Anxiety, chronic. Panic attacks.      Hx MJ use 3/2013 +ve screen. He denies any recent drug use. Urine drug screen negative 11/13.     Hx L knee injury, likely MCL sprain. Improved with PT consult.       PSA, Total (ng/mL)   Date Value   10/09/2019 0.92

## 2024-07-26 NOTE — PROGRESS NOTES
Subjective   Patient ID: Elisabeth Briseno is a 84 y.o. female who presents for Medicare Annual Wellness Visit Subsequent and Fatigue.  HPI  Patient presents today for a Medicare AWV.    Complaining of increased fatigue and dizziness for the last couple months . No OTC       Has no other new problem /question.      Advanced Care Planning  Elisabeth Briseno and I discussed their advance care plan preferences such as living will, and durable health care power of , which include: yes Attempt Resuscitation, yes Intubate & Ventilate, yes Comfort Care or Life- Sustaning Care. I reminded patient to talk with their health care agent, Mayra, about their health care goals. Note reflects patient's free will and care goals as expressed to me.       Review of Systems  Constitutional:  no chills, no fever and no night sweats.  Eyes: no blurred vision and no eyesight problems.  ENT: no hearing loss, no nasal congestion, no hoarseness and no sore throat.  Neck: no mass (es) and no swelling.  Cardiovascular: no chest pain, no intermittent leg claudication, no lower extremity edema, no palpitation and no syncope.  Respiratory: no cough, no shortness of breath during exertion, no shortness of breath at rest and no wheezing.  Gastrointestinal: no abdominal pain, no blood in stools, no constipation, no diarrhea, no melena, no nausea, no rectal pain and no vomiting.  Genitourinary: no dysuria, no change in urinary frequency, no urinary hesitancy and no feelings of urinary urgency.  Musculoskeletal: no arthralgias, no back pain and no myalgias.  Integumentary: no new skin lesions and no rashes.  Neurological: no difficulty walking, no headache, no limb weakness, no numbness and no tingling.  Psychiatric/Behavioral: no anxiety, no depression, no anhedonia and no substance use disorders.  Endocrine: no recent weight gain and no recent weight loss.  Hematologic/Lymphatic: no tendency for easy bruising and no swollen  glands    Objective   Physical Exam  Patient is sure whether daughter she is here for annual Medicare wellness exam feels fatigued and tired intermittent dizziness no fever no chills.  No change in activity level or diet.  Well-developed well-nourished 84-year-old no acute distress  Fatigue physical exam today's office visit constitutional alert and oriented x3.    Head is atraumatic HEENT is within normal limits.    Neck supple no masses full range of motion.    Thyroid is normal in size no thyromegaly there is no carotid bruits.    Pulmonary exam shows clear to auscultation no respiratory distress.    Cardiovascular shows no murmur rub or gallop.  Regular rate and rhythm.    Abdominal exam soft nontender no hepatosplenomegaly or masses normal bowel sounds no rebound no guarding.    Musculoskeletal exam no joint pain no muscle pain full range of motion.    Psych exam normal mood and affect.    Dermatologic exam no skin lesions no rash no blemishes.    Neuro exam is no focal deficits.  Normal exam.    Extremities no edema normal pulses normal capillary refill  No new physical complaints.  Sleeping okay appetites been RI.  No change in physical activity she has not been outside in the heat.  /64   Pulse 74   Temp 36.6 °C (97.8 °F)   Resp 16   Ht 1.524 m (5')   Wt 77.7 kg (171 lb 6.4 oz)   SpO2 98%   BMI 33.47 kg/m²     Lab Results   Component Value Date    WBC 5.7 07/19/2023    HGB 13.3 07/19/2023    HCT 40.4 07/19/2023    MCV 85 07/19/2023     07/19/2023       Assessment/Plan plan is to get blood drawn no acute changes sooner chronic medical problems we will follow-up when we have the results.  Problem List Items Addressed This Visit          Cardiac and Vasculature    Hypertension    Relevant Orders    CBC and Auto Differential    Comprehensive metabolic panel    Lipid panel       Endocrine/Metabolic    Hypothyroidism    Relevant Orders    T4, free    TSH    Type 2 diabetes mellitus (Multi)     Relevant Orders    CBC and Auto Differential    Comprehensive metabolic panel    Lipid panel    Hemoglobin A1c    Albumin-Creatinine Ratio, Urine Random     Other Visit Diagnoses       Medicare annual wellness visit, subsequent    -  Primary    Other fatigue        Relevant Orders    CBC and Auto Differential    Comprehensive metabolic panel    T4, free    TSH    Vitamin D 25-Hydroxy,Total (for eval of Vitamin D levels)    Vitamin B12    Vitamin D deficiency        Relevant Orders    Vitamin D 25-Hydroxy,Total (for eval of Vitamin D levels)            Medicare wellness questionnaire reviewed in detail.   No problems with activities of daily living. Home safety issues reviewed.   Advanced care planning discussed with patient.  Reminded to have an updated will if needed.

## 2024-07-29 ENCOUNTER — LAB (OUTPATIENT)
Dept: LAB | Facility: LAB | Age: 84
End: 2024-07-29
Payer: MEDICARE

## 2024-07-29 ENCOUNTER — APPOINTMENT (OUTPATIENT)
Dept: PRIMARY CARE | Facility: CLINIC | Age: 84
End: 2024-07-29
Payer: MEDICARE

## 2024-07-29 VITALS
WEIGHT: 171.4 LBS | RESPIRATION RATE: 16 BRPM | HEIGHT: 60 IN | OXYGEN SATURATION: 98 % | SYSTOLIC BLOOD PRESSURE: 128 MMHG | BODY MASS INDEX: 33.65 KG/M2 | DIASTOLIC BLOOD PRESSURE: 64 MMHG | TEMPERATURE: 97.8 F | HEART RATE: 74 BPM

## 2024-07-29 DIAGNOSIS — R53.83 OTHER FATIGUE: ICD-10-CM

## 2024-07-29 DIAGNOSIS — Z00.00 MEDICARE ANNUAL WELLNESS VISIT, SUBSEQUENT: Primary | ICD-10-CM

## 2024-07-29 DIAGNOSIS — E03.9 HYPOTHYROIDISM, UNSPECIFIED TYPE: ICD-10-CM

## 2024-07-29 DIAGNOSIS — I15.9 SECONDARY HYPERTENSION: ICD-10-CM

## 2024-07-29 DIAGNOSIS — I10 PRIMARY HYPERTENSION: ICD-10-CM

## 2024-07-29 DIAGNOSIS — E11.9 TYPE 2 DIABETES MELLITUS WITHOUT COMPLICATION, WITHOUT LONG-TERM CURRENT USE OF INSULIN (MULTI): ICD-10-CM

## 2024-07-29 DIAGNOSIS — E55.9 VITAMIN D DEFICIENCY: ICD-10-CM

## 2024-07-29 LAB
25(OH)D3 SERPL-MCNC: 46 NG/ML (ref 30–100)
ALBUMIN SERPL BCP-MCNC: 4.1 G/DL (ref 3.4–5)
ALP SERPL-CCNC: 81 U/L (ref 33–136)
ALT SERPL W P-5'-P-CCNC: 14 U/L (ref 7–45)
ANION GAP SERPL CALC-SCNC: 10 MMOL/L (ref 10–20)
AST SERPL W P-5'-P-CCNC: 14 U/L (ref 9–39)
BASOPHILS # BLD AUTO: 0.04 X10*3/UL (ref 0–0.1)
BASOPHILS NFR BLD AUTO: 0.8 %
BILIRUB SERPL-MCNC: 0.6 MG/DL (ref 0–1.2)
BUN SERPL-MCNC: 13 MG/DL (ref 6–23)
CALCIUM SERPL-MCNC: 9.4 MG/DL (ref 8.6–10.3)
CHLORIDE SERPL-SCNC: 100 MMOL/L (ref 98–107)
CHOLEST SERPL-MCNC: 184 MG/DL (ref 0–199)
CHOLESTEROL/HDL RATIO: 2.4
CO2 SERPL-SCNC: 30 MMOL/L (ref 21–32)
CREAT SERPL-MCNC: 0.71 MG/DL (ref 0.5–1.05)
CREAT UR-MCNC: 35.4 MG/DL (ref 20–320)
EGFRCR SERPLBLD CKD-EPI 2021: 83 ML/MIN/1.73M*2
EOSINOPHIL # BLD AUTO: 0.15 X10*3/UL (ref 0–0.4)
EOSINOPHIL NFR BLD AUTO: 2.9 %
ERYTHROCYTE [DISTWIDTH] IN BLOOD BY AUTOMATED COUNT: 14 % (ref 11.5–14.5)
EST. AVERAGE GLUCOSE BLD GHB EST-MCNC: 126 MG/DL
GLUCOSE SERPL-MCNC: 93 MG/DL (ref 74–99)
HBA1C MFR BLD: 6 %
HCT VFR BLD AUTO: 39.7 % (ref 36–46)
HDLC SERPL-MCNC: 75.5 MG/DL
HGB BLD-MCNC: 13.1 G/DL (ref 12–16)
IMM GRANULOCYTES # BLD AUTO: 0.02 X10*3/UL (ref 0–0.5)
IMM GRANULOCYTES NFR BLD AUTO: 0.4 % (ref 0–0.9)
LDLC SERPL CALC-MCNC: 86 MG/DL
LYMPHOCYTES # BLD AUTO: 1.75 X10*3/UL (ref 0.8–3)
LYMPHOCYTES NFR BLD AUTO: 33.6 %
MCH RBC QN AUTO: 29 PG (ref 26–34)
MCHC RBC AUTO-ENTMCNC: 33 G/DL (ref 32–36)
MCV RBC AUTO: 88 FL (ref 80–100)
MICROALBUMIN UR-MCNC: <7 MG/L
MICROALBUMIN/CREAT UR: NORMAL MG/G{CREAT}
MONOCYTES # BLD AUTO: 0.51 X10*3/UL (ref 0.05–0.8)
MONOCYTES NFR BLD AUTO: 9.8 %
NEUTROPHILS # BLD AUTO: 2.74 X10*3/UL (ref 1.6–5.5)
NEUTROPHILS NFR BLD AUTO: 52.5 %
NON HDL CHOLESTEROL: 109 MG/DL (ref 0–149)
NRBC BLD-RTO: 0 /100 WBCS (ref 0–0)
PLATELET # BLD AUTO: 311 X10*3/UL (ref 150–450)
POTASSIUM SERPL-SCNC: 4.1 MMOL/L (ref 3.5–5.3)
PROT SERPL-MCNC: 6.5 G/DL (ref 6.4–8.2)
RBC # BLD AUTO: 4.52 X10*6/UL (ref 4–5.2)
SODIUM SERPL-SCNC: 136 MMOL/L (ref 136–145)
T4 FREE SERPL-MCNC: 1 NG/DL (ref 0.61–1.12)
TRIGL SERPL-MCNC: 115 MG/DL (ref 0–149)
TSH SERPL-ACNC: 2.58 MIU/L (ref 0.44–3.98)
VIT B12 SERPL-MCNC: 1134 PG/ML (ref 211–911)
VLDL: 23 MG/DL (ref 0–40)
WBC # BLD AUTO: 5.2 X10*3/UL (ref 4.4–11.3)

## 2024-07-29 PROCEDURE — 82043 UR ALBUMIN QUANTITATIVE: CPT

## 2024-07-29 PROCEDURE — 1159F MED LIST DOCD IN RCRD: CPT | Performed by: FAMILY MEDICINE

## 2024-07-29 PROCEDURE — 1170F FXNL STATUS ASSESSED: CPT | Performed by: FAMILY MEDICINE

## 2024-07-29 PROCEDURE — 84439 ASSAY OF FREE THYROXINE: CPT

## 2024-07-29 PROCEDURE — 80061 LIPID PANEL: CPT

## 2024-07-29 PROCEDURE — 82570 ASSAY OF URINE CREATININE: CPT

## 2024-07-29 PROCEDURE — 80053 COMPREHEN METABOLIC PANEL: CPT

## 2024-07-29 PROCEDURE — 1036F TOBACCO NON-USER: CPT | Performed by: FAMILY MEDICINE

## 2024-07-29 PROCEDURE — 82607 VITAMIN B-12: CPT

## 2024-07-29 PROCEDURE — 83036 HEMOGLOBIN GLYCOSYLATED A1C: CPT

## 2024-07-29 PROCEDURE — 82306 VITAMIN D 25 HYDROXY: CPT

## 2024-07-29 PROCEDURE — 85025 COMPLETE CBC W/AUTO DIFF WBC: CPT

## 2024-07-29 PROCEDURE — 1123F ACP DISCUSS/DSCN MKR DOCD: CPT | Performed by: FAMILY MEDICINE

## 2024-07-29 PROCEDURE — G0439 PPPS, SUBSEQ VISIT: HCPCS | Performed by: FAMILY MEDICINE

## 2024-07-29 PROCEDURE — 3074F SYST BP LT 130 MM HG: CPT | Performed by: FAMILY MEDICINE

## 2024-07-29 PROCEDURE — 3078F DIAST BP <80 MM HG: CPT | Performed by: FAMILY MEDICINE

## 2024-07-29 PROCEDURE — 84443 ASSAY THYROID STIM HORMONE: CPT

## 2024-07-29 PROCEDURE — 36415 COLL VENOUS BLD VENIPUNCTURE: CPT

## 2024-07-29 ASSESSMENT — ANXIETY QUESTIONNAIRES
IF YOU CHECKED OFF ANY PROBLEMS ON THIS QUESTIONNAIRE, HOW DIFFICULT HAVE THESE PROBLEMS MADE IT FOR YOU TO DO YOUR WORK, TAKE CARE OF THINGS AT HOME, OR GET ALONG WITH OTHER PEOPLE: NOT DIFFICULT AT ALL
7. FEELING AFRAID AS IF SOMETHING AWFUL MIGHT HAPPEN: NOT AT ALL
1. FEELING NERVOUS, ANXIOUS, OR ON EDGE: NOT AT ALL
4. TROUBLE RELAXING: NOT AT ALL
3. WORRYING TOO MUCH ABOUT DIFFERENT THINGS: NOT AT ALL
5. BEING SO RESTLESS THAT IT IS HARD TO SIT STILL: NOT AT ALL
2. NOT BEING ABLE TO STOP OR CONTROL WORRYING: NOT AT ALL
6. BECOMING EASILY ANNOYED OR IRRITABLE: NOT AT ALL
GAD7 TOTAL SCORE: 0

## 2024-07-29 ASSESSMENT — ACTIVITIES OF DAILY LIVING (ADL)
DOING_HOUSEWORK: INDEPENDENT
BATHING: INDEPENDENT
TAKING_MEDICATION: INDEPENDENT
MANAGING_FINANCES: INDEPENDENT
DRESSING: INDEPENDENT
GROCERY_SHOPPING: INDEPENDENT

## 2024-07-29 ASSESSMENT — PATIENT HEALTH QUESTIONNAIRE - PHQ9
1. LITTLE INTEREST OR PLEASURE IN DOING THINGS: NOT AT ALL
1. LITTLE INTEREST OR PLEASURE IN DOING THINGS: NOT AT ALL
2. FEELING DOWN, DEPRESSED OR HOPELESS: NOT AT ALL
SUM OF ALL RESPONSES TO PHQ9 QUESTIONS 1 AND 2: 0
2. FEELING DOWN, DEPRESSED OR HOPELESS: NOT AT ALL
SUM OF ALL RESPONSES TO PHQ9 QUESTIONS 1 & 2: 0

## 2024-07-29 ASSESSMENT — ENCOUNTER SYMPTOMS
DEPRESSION: 0
OCCASIONAL FEELINGS OF UNSTEADINESS: 0
LOSS OF SENSATION IN FEET: 0

## 2024-07-30 ENCOUNTER — TELEPHONE (OUTPATIENT)
Dept: PRIMARY CARE | Facility: CLINIC | Age: 84
End: 2024-07-30
Payer: MEDICARE

## 2024-07-30 DIAGNOSIS — R79.89 HIGH SERUM VITAMIN B12: ICD-10-CM

## 2024-07-30 RX ORDER — VALSARTAN AND HYDROCHLOROTHIAZIDE 80; 12.5 MG/1; MG/1
1 TABLET, FILM COATED ORAL DAILY
Qty: 90 TABLET | Refills: 1 | Status: SHIPPED | OUTPATIENT
Start: 2024-07-30

## 2024-07-30 NOTE — TELEPHONE ENCOUNTER
Patient aware. B12 lab ordered. Patient will inform us if she would like to proceed with physical therapy

## 2024-07-30 NOTE — TELEPHONE ENCOUNTER
----- Message from Parmjit Lenz sent at 7/30/2024  7:57 AM EDT -----  Labs are normal except B12 level is high stop supplementing B12 recheck a B12 level in 3 months.  If she continues to be fatigued and tired we can offer her physical therapy for strengthening and try to increase activity if she is willing to do that.  I do not see anything else significantly wrong.  Make sure she is getting enough sleep.

## 2024-09-17 DIAGNOSIS — E78.2 MIXED HYPERLIPIDEMIA: ICD-10-CM

## 2024-09-18 RX ORDER — ATORVASTATIN CALCIUM 40 MG/1
40 TABLET, FILM COATED ORAL NIGHTLY
Qty: 90 TABLET | Refills: 3 | Status: SHIPPED | OUTPATIENT
Start: 2024-09-18

## 2024-09-18 NOTE — TELEPHONE ENCOUNTER
Received request for prescription refills for patient.   Patient follows with Dr. Vipul Hamm MD, [facility]      Request is for Atorvastatin  Is patient currently on medication yes    Last OV 11/6/23  Next OV 11/6/24    Pended for signing and sent to provider

## 2024-11-04 ENCOUNTER — CLINICAL SUPPORT (OUTPATIENT)
Dept: PRIMARY CARE | Facility: CLINIC | Age: 84
End: 2024-11-04
Payer: MEDICARE

## 2024-11-04 DIAGNOSIS — Z23 ENCOUNTER FOR IMMUNIZATION: ICD-10-CM

## 2024-11-04 PROCEDURE — 90662 IIV NO PRSV INCREASED AG IM: CPT | Performed by: FAMILY MEDICINE

## 2024-11-04 PROCEDURE — G0009 ADMIN PNEUMOCOCCAL VACCINE: HCPCS | Performed by: FAMILY MEDICINE

## 2024-11-04 PROCEDURE — G0008 ADMIN INFLUENZA VIRUS VAC: HCPCS | Performed by: FAMILY MEDICINE

## 2024-11-04 PROCEDURE — 90677 PCV20 VACCINE IM: CPT | Performed by: FAMILY MEDICINE

## 2024-11-04 NOTE — PROGRESS NOTES
Patient presents today for flu and Pneumonia vaccines.    Fluzone was injected into right deltoid.  Patient tolerated well.  No redness or swelling of injection site.  Consent obtained.      Prevnar 20 was injected into right deltoid.  Patient tolerated well.  No redness or swelling of injection site.  Consent obtained.

## 2024-11-06 ENCOUNTER — APPOINTMENT (OUTPATIENT)
Dept: CARDIOLOGY | Facility: CLINIC | Age: 84
End: 2024-11-06
Payer: MEDICARE

## 2024-11-06 VITALS
BODY MASS INDEX: 33.4 KG/M2 | HEIGHT: 60 IN | WEIGHT: 170.1 LBS | SYSTOLIC BLOOD PRESSURE: 128 MMHG | DIASTOLIC BLOOD PRESSURE: 82 MMHG | HEART RATE: 72 BPM

## 2024-11-06 DIAGNOSIS — I25.10 CORONARY ARTERY DISEASE INVOLVING NATIVE CORONARY ARTERY OF NATIVE HEART WITHOUT ANGINA PECTORIS: ICD-10-CM

## 2024-11-06 DIAGNOSIS — I35.1 MILD AORTIC REGURGITATION: ICD-10-CM

## 2024-11-06 DIAGNOSIS — Z82.49 FAMILY HISTORY OF ISCHEMIC HEART DISEASE: ICD-10-CM

## 2024-11-06 DIAGNOSIS — Z78.9 NEVER SMOKED ANY SUBSTANCE: ICD-10-CM

## 2024-11-06 DIAGNOSIS — E11.9 TYPE 2 DIABETES MELLITUS WITHOUT COMPLICATION, WITHOUT LONG-TERM CURRENT USE OF INSULIN (MULTI): ICD-10-CM

## 2024-11-06 DIAGNOSIS — I34.0 MITRAL VALVE INSUFFICIENCY, UNSPECIFIED ETIOLOGY: ICD-10-CM

## 2024-11-06 DIAGNOSIS — I10 PRIMARY HYPERTENSION: ICD-10-CM

## 2024-11-06 DIAGNOSIS — E78.2 MIXED HYPERLIPIDEMIA: ICD-10-CM

## 2024-11-06 DIAGNOSIS — R93.1 ELEVATED CORONARY ARTERY CALCIUM SCORE: ICD-10-CM

## 2024-11-06 PROCEDURE — 1123F ACP DISCUSS/DSCN MKR DOCD: CPT | Performed by: INTERNAL MEDICINE

## 2024-11-06 PROCEDURE — 1036F TOBACCO NON-USER: CPT | Performed by: INTERNAL MEDICINE

## 2024-11-06 PROCEDURE — 99214 OFFICE O/P EST MOD 30 MIN: CPT | Performed by: INTERNAL MEDICINE

## 2024-11-06 PROCEDURE — 3074F SYST BP LT 130 MM HG: CPT | Performed by: INTERNAL MEDICINE

## 2024-11-06 PROCEDURE — 1159F MED LIST DOCD IN RCRD: CPT | Performed by: INTERNAL MEDICINE

## 2024-11-06 PROCEDURE — 3079F DIAST BP 80-89 MM HG: CPT | Performed by: INTERNAL MEDICINE

## 2024-11-06 RX ORDER — ROSUVASTATIN CALCIUM 5 MG/1
5 TABLET, COATED ORAL DAILY
Qty: 90 TABLET | Refills: 3 | Status: SHIPPED | OUTPATIENT
Start: 2024-11-06 | End: 2025-11-06

## 2024-11-06 RX ORDER — FAMOTIDINE 20 MG/1
20 TABLET, FILM COATED ORAL 2 TIMES DAILY PRN
COMMUNITY

## 2024-11-06 NOTE — PROGRESS NOTES
CARDIOLOGY OFFICE VISIT      CHIEF COMPLAINT      HISTORY OF PRESENT ILLNESS  The patient states that she is she has been doing well from a cardiac standpoint.  She denies chest discomfort or symptoms of myocardial ischemia.  She denies dyspnea exertion.  She denies palpitations and syncope.  She denies any problem with her current medications.  I did explain to her that her LDL is gone from 49-86.  This is because she is off atorvastatin.  She is worried about atorvastatin because of myalgias.  Her  apparently had bad myalgias which was thought to be due to atorvastatin.  I told her I will try small dose of generic Crestor 5 mg a day.      IMPRESSIONS:   1. Coronary artery disease manifested by CT coronary calcium score of 27  2. Mitral regurgitation, mild   3. Aortic regurgitation, mild  4. Positive family history of coronary artery disease.  5. Obesity  6. Hyperlipidemia  7. Hypertension  8. Diabetes Mellitus, Type 2     Please excuse any errors in grammar or translation related to this dictation. Voice recognition software was utilized to prepare this document.         Past Medical History  Past Medical History:   Diagnosis Date    Elevated blood-pressure reading, without diagnosis of hypertension 10/22/2019    Elevated blood pressure reading    Encounter for immunization 04/28/2021    Need for shingles vaccine    Encounter for screening for cardiovascular disorders 09/16/2019    Screening for cardiovascular condition    Lower abdominal pain, unspecified 03/02/2021    Lower abdominal pain    Neoplasm of unspecified behavior of bone, soft tissue, and skin 09/23/2019    Skin neoplasm    Other abnormal glucose     Elevated glucose    Other malaise 04/10/2021    Malaise and fatigue    Other malaise 09/16/2019    Malaise and fatigue    Pain in unspecified finger(s) 09/16/2019    Finger pain    Personal history of other diseases of the musculoskeletal system and connective tissue 09/01/2021    History of low  back pain    Personal history of other diseases of the musculoskeletal system and connective tissue 03/03/2021    History of muscle spasm    Personal history of other drug therapy 10/17/2019    History of influenza vaccination    Personal history of other specified conditions 04/10/2021    History of exertional chest pain    Personal history of other specified conditions 10/22/2019    History of headache    Personal history of other specified conditions 10/22/2019    History of dizziness    Personal history of other specified conditions 04/06/2020    History of epigastric pain    Personal history of other specified conditions 09/16/2019    History of urinary frequency    Shortness of breath 04/10/2021    SOB (shortness of breath) on exertion    Toxic effect of venom of bees, accidental (unintentional), initial encounter 08/06/2020    Bee sting, accidental or unintentional, initial encounter       Social History  Social History     Tobacco Use    Smoking status: Never    Smokeless tobacco: Never   Vaping Use    Vaping status: Never Used   Substance Use Topics    Alcohol use: Never    Drug use: Never       Family History     Family History   Problem Relation Name Age of Onset    Diabetes Mother      Heart attack Mother      Coronary artery disease Mother      Heart failure Mother      Coronary artery disease Father      Stroke Father      Breast cancer Sister      Diabetes Brother          Allergies:  No Known Allergies     Outpatient Medications:  Current Outpatient Medications   Medication Instructions    aspirin 81 mg EC tablet 1 tablet, Every other day    atorvastatin (LIPITOR) 40 mg, oral, Nightly    calcium carbonate-vitamin D3 600 mg-20 mcg (800 unit) tablet 1 tablet, 2 times daily    calcium carbonate 600 mg, Daily RT    famotidine (Pepcid) 20 mg tablet TAKE 1 TABLET BY MOUTH TWICE  DAILY    famotidine (PEPCID) 20 mg, 2 times daily PRN    ibuprofen (Advil Migraine) capsule     meclizine (ANTIVERT) 25 mg, oral,  3 times daily PRN    multivitamin tablet 1 tablet, Daily    valsartan-hydrochlorothiazide (Diovan-HCT) 80-12.5 mg tablet 1 tablet, oral, Daily          REVIEW OF SYSTEMS  Review of Systems   All other systems reviewed and are negative.        VITALS  Vitals:    11/06/24 1040   BP: 128/82   Pulse: 72       PHYSICAL EXAM  Vitals and nursing note reviewed.   Constitutional:       Appearance: Healthy appearance.   Eyes:      Conjunctiva/sclera: Conjunctivae normal.      Pupils: Pupils are equal, round, and reactive to light.   Pulmonary:      Effort: Pulmonary effort is normal.      Breath sounds: Normal breath sounds.   Cardiovascular:      PMI at left midclavicular line. Normal rate. Regular rhythm.      Murmurs: There is no murmur.      No gallop.  No click. No rub.   Pulses:     Intact distal pulses.   Edema:     Peripheral edema absent.   Musculoskeletal: Normal range of motion. Skin:     General: Skin is warm and dry.   Neurological:      Mental Status: Alert and oriented to person, place and time.           ASSESSMENT AND PLAN  Diagnoses and all orders for this visit:  Coronary artery disease involving native coronary artery of native heart without angina pectoris  Elevated coronary artery calcium score  Mitral valve insufficiency, unspecified etiology  Mild aortic regurgitation  Family history of ischemic heart disease  Mixed hyperlipidemia  Primary hypertension  Type 2 diabetes mellitus without complication, without long-term current use of insulin (Multi)  BMI 33.0-33.9,adult  Never smoked any substance      [unfilled]

## 2024-11-06 NOTE — PATIENT INSTRUCTIONS
ART Rosuvastatin 5 mg once a day  Blood work to recheck cholesterol to be done 2 months after starting Rosuvastatin.  .1ye3    Continue same medications/treatment.  Patient educated on proper medication use.  Patient educated on risk factor modification.  Please bring any lab results from other providers / physicians to your next appointment.    Please bring all medicines, vitamins and herbal supplements with you when you come to the office.    Prescriptions will not be filled unless you are compliant with your follow up appointments or have a follow up  appointment scheduled as per instruction of your physician.  Refills should be requested at the time of  Your visit.    Mine MCKAY LPN, am scribing for and in the presence of Dr. Vipul Hamm MD, FACC

## 2024-12-24 DIAGNOSIS — I15.9 SECONDARY HYPERTENSION: ICD-10-CM

## 2024-12-26 ENCOUNTER — OFFICE VISIT (OUTPATIENT)
Dept: PRIMARY CARE | Facility: CLINIC | Age: 84
End: 2024-12-26
Payer: MEDICARE

## 2024-12-26 ENCOUNTER — HOSPITAL ENCOUNTER (OUTPATIENT)
Dept: RADIOLOGY | Facility: CLINIC | Age: 84
Discharge: HOME | End: 2024-12-26
Payer: MEDICARE

## 2024-12-26 VITALS
TEMPERATURE: 96.4 F | HEART RATE: 76 BPM | HEIGHT: 60 IN | BODY MASS INDEX: 33.38 KG/M2 | RESPIRATION RATE: 16 BRPM | OXYGEN SATURATION: 96 % | SYSTOLIC BLOOD PRESSURE: 135 MMHG | DIASTOLIC BLOOD PRESSURE: 80 MMHG | WEIGHT: 170 LBS

## 2024-12-26 DIAGNOSIS — J22 LOWER RESP. TRACT INFECTION: ICD-10-CM

## 2024-12-26 DIAGNOSIS — J22 LOWER RESP. TRACT INFECTION: Primary | ICD-10-CM

## 2024-12-26 DIAGNOSIS — R06.2 WHEEZING: ICD-10-CM

## 2024-12-26 PROBLEM — R91.8 GROUND GLASS OPACITY PRESENT ON IMAGING OF LUNG: Status: RESOLVED | Noted: 2023-11-04 | Resolved: 2024-12-26

## 2024-12-26 PROCEDURE — 3079F DIAST BP 80-89 MM HG: CPT | Performed by: NURSE PRACTITIONER

## 2024-12-26 PROCEDURE — 1160F RVW MEDS BY RX/DR IN RCRD: CPT | Performed by: NURSE PRACTITIONER

## 2024-12-26 PROCEDURE — 1123F ACP DISCUSS/DSCN MKR DOCD: CPT | Performed by: NURSE PRACTITIONER

## 2024-12-26 PROCEDURE — 71046 X-RAY EXAM CHEST 2 VIEWS: CPT

## 2024-12-26 PROCEDURE — 87637 SARSCOV2&INF A&B&RSV AMP PRB: CPT

## 2024-12-26 PROCEDURE — 3075F SYST BP GE 130 - 139MM HG: CPT | Performed by: NURSE PRACTITIONER

## 2024-12-26 PROCEDURE — 99213 OFFICE O/P EST LOW 20 MIN: CPT | Performed by: NURSE PRACTITIONER

## 2024-12-26 PROCEDURE — 1159F MED LIST DOCD IN RCRD: CPT | Performed by: NURSE PRACTITIONER

## 2024-12-26 RX ORDER — PREDNISONE 10 MG/1
TABLET ORAL
Qty: 30 TABLET | Refills: 0 | Status: SHIPPED | OUTPATIENT
Start: 2024-12-26 | End: 2025-01-06

## 2024-12-26 RX ORDER — DOXYCYCLINE 100 MG/1
100 CAPSULE ORAL 2 TIMES DAILY
Qty: 20 CAPSULE | Refills: 0 | Status: SHIPPED | OUTPATIENT
Start: 2024-12-26 | End: 2025-01-05

## 2024-12-26 ASSESSMENT — ENCOUNTER SYMPTOMS
NECK PAIN: 0
FACIAL ASYMMETRY: 0
ABDOMINAL PAIN: 0
NERVOUS/ANXIOUS: 0
WHEEZING: 1
SORE THROAT: 0
CHILLS: 1
SINUS PAIN: 0
WOUND: 0
UNEXPECTED WEIGHT CHANGE: 0
DIAPHORESIS: 0
ACTIVITY CHANGE: 0
FEVER: 0
FATIGUE: 1
PALPITATIONS: 0
HEADACHES: 1
CHEST TIGHTNESS: 0
SHORTNESS OF BREATH: 0
CONFUSION: 0
BACK PAIN: 0
RHINORRHEA: 1
COUGH: 1
MYALGIAS: 1
DIZZINESS: 0
SINUS PRESSURE: 0
APPETITE CHANGE: 1

## 2024-12-26 NOTE — PROGRESS NOTES
Subjective   Patient ID: Elisabeth Briseno is a 84 y.o. female who presents for Nasal Congestion and Cough.    HPI entered by Stacey Cain MA and reviewed by myself.   Patient presents in the office today with complaints of a wet cough.   Patient states that the cough is producing a yellow colored sputum.   Patient states has congestion, headaches.   She states it hurts in her chest when she coughs  Patient admits this cough keeps them awake at night.   Ongoing X 3 days, continuing to worsen.  Has tried Asprin OTC sinus and cold medication.  Denies chest pains or shortness of breath     The patient's allergies, medications, and past medical/surgical/family history were reviewed with them today and updated as indicated.    Review of Systems   Constitutional:  Positive for appetite change, chills and fatigue. Negative for activity change, diaphoresis, fever and unexpected weight change.   HENT:  Positive for congestion, rhinorrhea and sneezing. Negative for sinus pressure, sinus pain and sore throat.    Respiratory:  Positive for cough and wheezing. Negative for chest tightness and shortness of breath.    Cardiovascular:  Negative for chest pain, palpitations and leg swelling.   Gastrointestinal:  Negative for abdominal pain.   Musculoskeletal:  Positive for myalgias. Negative for back pain and neck pain.   Skin:  Negative for rash and wound.   Allergic/Immunologic: Negative for environmental allergies.   Neurological:  Positive for headaches. Negative for dizziness, syncope and facial asymmetry.   Psychiatric/Behavioral:  Negative for confusion. The patient is not nervous/anxious.       Objective   Vital Signs: /80   Pulse 76   Temp 35.8 °C (96.4 °F) (Temporal)   Resp 16   Ht 1.524 m (5')   Wt 77.1 kg (170 lb)   SpO2 96%   BMI 33.20 kg/m²     Physical Exam  Vitals and nursing note reviewed.   Constitutional:       General: She is not in acute distress.     Appearance: Normal appearance. She is not  ill-appearing.   HENT:      Head: Normocephalic and atraumatic.      Right Ear: Tympanic membrane, ear canal and external ear normal. No drainage or swelling. No middle ear effusion. No mastoid tenderness. Tympanic membrane is not erythematous, retracted or bulging.      Left Ear: Tympanic membrane, ear canal and external ear normal. No drainage or swelling.  No middle ear effusion. No mastoid tenderness. Tympanic membrane is not erythematous, retracted or bulging.      Nose: No congestion or rhinorrhea.      Right Sinus: No maxillary sinus tenderness or frontal sinus tenderness.      Left Sinus: No maxillary sinus tenderness or frontal sinus tenderness.      Mouth/Throat:      Mouth: Mucous membranes are moist.      Tongue: No lesions.      Palate: No mass and lesions.      Pharynx: Uvula midline. No oropharyngeal exudate, posterior oropharyngeal erythema, uvula swelling or postnasal drip.      Tonsils: No tonsillar exudate or tonsillar abscesses.   Eyes:      General:         Right eye: No discharge.         Left eye: No discharge.      Extraocular Movements: Extraocular movements intact.      Conjunctiva/sclera: Conjunctivae normal.      Pupils: Pupils are equal, round, and reactive to light.   Cardiovascular:      Rate and Rhythm: Normal rate and regular rhythm.      Heart sounds: No murmur heard.  Pulmonary:      Effort: Pulmonary effort is normal. No respiratory distress.      Breath sounds: Normal air entry. Wheezing present.   Musculoskeletal:      Right lower leg: No edema.      Left lower leg: No edema.   Lymphadenopathy:      Cervical: No cervical adenopathy.   Skin:     General: Skin is warm and dry.      Coloration: Skin is not jaundiced.      Findings: No erythema or rash.   Neurological:      General: No focal deficit present.      Mental Status: She is alert. Mental status is at baseline.   Psychiatric:         Mood and Affect: Mood normal.         Behavior: Behavior normal.         Thought Content:  Thought content normal.     POCT today: No results found for this visit on 12/26/24.     Assessment & Plan  1. Lower resp. tract infection  RSV PCR    Sars-CoV-2 and Influenza A/B PCR    XR chest 2 views    predniSONE (Deltasone) 40 mg taper - taper as directed     doxycycline (Vibramycin) 100 mg capsule  Continue supportive care with OTC medications as needed, rest, fluids. Let us know if symptoms persist or fail to completely resolve to baseline with current treatment. Verbalized understanding.        2. Wheezing  See above.      Reviewed treatment options and health maintenance. Take medications as prescribed. We will contact you with the results of any ordered testing; please send a [employer] message or call the office if you do not hear from us. Follow up in the office as previously discussed with your PCP. Return sooner if symptoms do not resolve as expected.     Desirae Colmenares, APRN-CNP, DNP, CCRN  Family Nurse Practitioner  [facility]

## 2024-12-27 DIAGNOSIS — J10.1 INFLUENZA A: Primary | ICD-10-CM

## 2024-12-27 LAB
FLUAV RNA RESP QL NAA+PROBE: DETECTED
FLUBV RNA RESP QL NAA+PROBE: NOT DETECTED
RSV RNA RESP QL NAA+PROBE: NOT DETECTED
SARS-COV-2 RNA RESP QL NAA+PROBE: NOT DETECTED

## 2024-12-27 RX ORDER — VALSARTAN AND HYDROCHLOROTHIAZIDE 80; 12.5 MG/1; MG/1
1 TABLET, FILM COATED ORAL DAILY
Qty: 90 TABLET | Refills: 0 | Status: SHIPPED | OUTPATIENT
Start: 2024-12-27

## 2024-12-27 RX ORDER — OSELTAMIVIR PHOSPHATE 75 MG/1
75 CAPSULE ORAL 2 TIMES DAILY
Qty: 10 CAPSULE | Refills: 0 | Status: SHIPPED | OUTPATIENT
Start: 2024-12-27 | End: 2025-01-01

## 2024-12-27 NOTE — TELEPHONE ENCOUNTER
----- Message from Desirae Ashleydaniella sent at 12/27/2024 10:21 AM EST -----  Please let Elisabeth Briseno know her viral swab came back positive for influenza A.  Her chest x-ray reading is not back yet, but there may be faint pneumonia in the bottom of her right lung.  Continue the antibiotics and steroids I ordered yesterday. I also sent an order for Tamiflu to the pharmacy. It is an antiviral medication that slows down the replication of the flu virus in her system. If it makes her stomach upset, she can stop it. She should stay home/avoid being around people until she is feeling better to avoid spreading this to others.  If she gets worse, she needs to go to the hospital.  Thanks,  Desirae

## 2024-12-30 ENCOUNTER — TELEPHONE (OUTPATIENT)
Dept: PRIMARY CARE | Facility: CLINIC | Age: 84
End: 2024-12-30
Payer: MEDICARE

## 2024-12-30 NOTE — TELEPHONE ENCOUNTER
----- Message from Desirae Colmenares sent at 12/29/2024 11:22 PM EST -----  Please let Elisabeth Briseno know her CXR is negative for pneumonia. Continue plan as discussed during the visit.    Thanks,  Desirae

## 2025-02-14 LAB
CHOLEST SERPL-MCNC: 158 MG/DL
CHOLEST/HDLC SERPL: 2 (CALC)
HDLC SERPL-MCNC: 78 MG/DL
LDLC SERPL CALC-MCNC: 62 MG/DL (CALC)
NONHDLC SERPL-MCNC: 80 MG/DL (CALC)
TRIGL SERPL-MCNC: 101 MG/DL

## 2025-02-17 ENCOUNTER — TELEPHONE (OUTPATIENT)
Dept: CARDIOLOGY | Facility: CLINIC | Age: 85
End: 2025-02-17
Payer: MEDICARE

## 2025-02-17 NOTE — TELEPHONE ENCOUNTER
----- Message from Nurse Mine MORALES sent at 2/17/2025  9:03 AM EST -----    ----- Message -----  From: Vipul Hamm MD  Sent: 2/17/2025   8:45 AM EST  To: Mien Fortune LPN    Lipids very good  ----- Message -----  From: [employer], [employer] Results In  Sent: 2/14/2025   8:07 PM EST  To: Vipul Hamm MD

## 2025-02-25 DIAGNOSIS — I15.9 SECONDARY HYPERTENSION: ICD-10-CM

## 2025-02-26 RX ORDER — VALSARTAN AND HYDROCHLOROTHIAZIDE 80; 12.5 MG/1; MG/1
1 TABLET, FILM COATED ORAL DAILY
Qty: 90 TABLET | Refills: 3 | Status: SHIPPED | OUTPATIENT
Start: 2025-02-26

## 2025-08-27 ENCOUNTER — OFFICE VISIT (OUTPATIENT)
Age: 85
End: 2025-08-27
Payer: MEDICARE

## 2025-08-27 VITALS
DIASTOLIC BLOOD PRESSURE: 75 MMHG | RESPIRATION RATE: 18 BRPM | SYSTOLIC BLOOD PRESSURE: 157 MMHG | HEART RATE: 75 BPM | HEIGHT: 60 IN | OXYGEN SATURATION: 95 % | WEIGHT: 163 LBS | TEMPERATURE: 98 F | BODY MASS INDEX: 32 KG/M2

## 2025-08-27 DIAGNOSIS — L23.7 CONTACT DERMATITIS DUE TO RHUS TOXICODENDRON: Primary | ICD-10-CM

## 2025-08-27 RX ORDER — PREDNISONE 20 MG/1
20 TABLET ORAL 2 TIMES DAILY
Qty: 14 TABLET | Refills: 0 | Status: SHIPPED | OUTPATIENT
Start: 2025-08-27 | End: 2025-09-03

## 2025-09-09 ENCOUNTER — APPOINTMENT (OUTPATIENT)
Dept: PRIMARY CARE | Facility: CLINIC | Age: 85
End: 2025-09-09
Payer: MEDICARE

## 2025-09-11 ENCOUNTER — APPOINTMENT (OUTPATIENT)
Dept: PRIMARY CARE | Facility: CLINIC | Age: 85
End: 2025-09-11
Payer: MEDICARE

## 2025-11-06 ENCOUNTER — APPOINTMENT (OUTPATIENT)
Dept: CARDIOLOGY | Facility: CLINIC | Age: 85
End: 2025-11-06
Payer: MEDICARE